# Patient Record
Sex: FEMALE | Race: BLACK OR AFRICAN AMERICAN | NOT HISPANIC OR LATINO
[De-identification: names, ages, dates, MRNs, and addresses within clinical notes are randomized per-mention and may not be internally consistent; named-entity substitution may affect disease eponyms.]

---

## 2017-03-17 PROBLEM — Z00.00 ENCOUNTER FOR PREVENTIVE HEALTH EXAMINATION: Status: ACTIVE | Noted: 2017-03-17

## 2017-03-29 ENCOUNTER — APPOINTMENT (OUTPATIENT)
Dept: ORTHOPEDIC SURGERY | Facility: CLINIC | Age: 47
End: 2017-03-29

## 2017-03-29 VITALS — BODY MASS INDEX: 31.7 KG/M2 | HEIGHT: 58 IN | WEIGHT: 151 LBS

## 2017-03-29 DIAGNOSIS — S83.241A OTHER TEAR OF MEDIAL MENISCUS, CURRENT INJURY, RIGHT KNEE, INITIAL ENCOUNTER: ICD-10-CM

## 2017-03-29 DIAGNOSIS — M25.561 PAIN IN RIGHT KNEE: ICD-10-CM

## 2017-03-29 DIAGNOSIS — M22.40 CHONDROMALACIA PATELLAE, UNSPECIFIED KNEE: ICD-10-CM

## 2017-03-29 DIAGNOSIS — Z78.9 OTHER SPECIFIED HEALTH STATUS: ICD-10-CM

## 2017-03-29 DIAGNOSIS — M25.562 PAIN IN RIGHT KNEE: ICD-10-CM

## 2017-03-29 RX ORDER — AZITHROMYCIN 250 MG/1
250 TABLET, FILM COATED ORAL
Qty: 6 | Refills: 0 | Status: DISCONTINUED | COMMUNITY
Start: 2016-12-05

## 2017-04-04 ENCOUNTER — OUTPATIENT (OUTPATIENT)
Dept: OUTPATIENT SERVICES | Facility: HOSPITAL | Age: 47
LOS: 1 days | End: 2017-04-04
Payer: COMMERCIAL

## 2017-04-04 ENCOUNTER — APPOINTMENT (OUTPATIENT)
Dept: MRI IMAGING | Facility: IMAGING CENTER | Age: 47
End: 2017-04-04

## 2017-04-04 DIAGNOSIS — Z98.89 OTHER SPECIFIED POSTPROCEDURAL STATES: Chronic | ICD-10-CM

## 2017-04-04 DIAGNOSIS — M25.561 PAIN IN RIGHT KNEE: ICD-10-CM

## 2017-04-04 PROCEDURE — 73721 MRI JNT OF LWR EXTRE W/O DYE: CPT

## 2017-04-10 ENCOUNTER — RESULT REVIEW (OUTPATIENT)
Age: 47
End: 2017-04-10

## 2017-04-14 ENCOUNTER — RESULT REVIEW (OUTPATIENT)
Age: 47
End: 2017-04-14

## 2017-07-31 ENCOUNTER — EMERGENCY (EMERGENCY)
Facility: HOSPITAL | Age: 47
LOS: 0 days | Discharge: HOME | End: 2017-07-31
Attending: EMERGENCY MEDICINE
Payer: COMMERCIAL

## 2017-07-31 VITALS
SYSTOLIC BLOOD PRESSURE: 125 MMHG | OXYGEN SATURATION: 99 % | WEIGHT: 143.08 LBS | HEIGHT: 59 IN | HEART RATE: 89 BPM | RESPIRATION RATE: 18 BRPM | DIASTOLIC BLOOD PRESSURE: 77 MMHG | TEMPERATURE: 98 F

## 2017-07-31 VITALS
SYSTOLIC BLOOD PRESSURE: 120 MMHG | RESPIRATION RATE: 16 BRPM | HEART RATE: 83 BPM | OXYGEN SATURATION: 98 % | DIASTOLIC BLOOD PRESSURE: 79 MMHG

## 2017-07-31 DIAGNOSIS — Z98.89 OTHER SPECIFIED POSTPROCEDURAL STATES: Chronic | ICD-10-CM

## 2017-07-31 LAB
ALBUMIN SERPL ELPH-MCNC: 3.5 G/DL — SIGNIFICANT CHANGE UP (ref 3.3–5)
ALP SERPL-CCNC: 56 U/L — SIGNIFICANT CHANGE UP (ref 40–120)
ALT FLD-CCNC: 22 U/L — SIGNIFICANT CHANGE UP (ref 12–78)
ANION GAP SERPL CALC-SCNC: 9 MMOL/L — SIGNIFICANT CHANGE UP (ref 5–17)
AST SERPL-CCNC: 19 U/L — SIGNIFICANT CHANGE UP (ref 15–37)
BASOPHILS # BLD AUTO: 0.1 K/UL — SIGNIFICANT CHANGE UP (ref 0–0.2)
BASOPHILS NFR BLD AUTO: 0.9 % — SIGNIFICANT CHANGE UP (ref 0–2)
BILIRUB SERPL-MCNC: 0.2 MG/DL — SIGNIFICANT CHANGE UP (ref 0.2–1.2)
BUN SERPL-MCNC: 11 MG/DL — SIGNIFICANT CHANGE UP (ref 7–23)
CALCIUM SERPL-MCNC: 8.3 MG/DL — LOW (ref 8.5–10.1)
CHLORIDE SERPL-SCNC: 103 MMOL/L — SIGNIFICANT CHANGE UP (ref 96–108)
CK MB BLD-MCNC: <0.6 % — SIGNIFICANT CHANGE UP (ref 0–3.5)
CK MB CFR SERPL CALC: <0.5 NG/ML — SIGNIFICANT CHANGE UP (ref 0.5–3.6)
CK SERPL-CCNC: 83 U/L — SIGNIFICANT CHANGE UP (ref 26–192)
CO2 SERPL-SCNC: 27 MMOL/L — SIGNIFICANT CHANGE UP (ref 22–31)
CREAT SERPL-MCNC: 0.72 MG/DL — SIGNIFICANT CHANGE UP (ref 0.5–1.3)
D DIMER BLD IA.RAPID-MCNC: 220 NG/ML DDU — SIGNIFICANT CHANGE UP
EOSINOPHIL # BLD AUTO: 0.1 K/UL — SIGNIFICANT CHANGE UP (ref 0–0.5)
EOSINOPHIL NFR BLD AUTO: 1.4 % — SIGNIFICANT CHANGE UP (ref 0–6)
GLUCOSE SERPL-MCNC: 85 MG/DL — SIGNIFICANT CHANGE UP (ref 70–99)
HCG SERPL-ACNC: <1 MIU/ML — SIGNIFICANT CHANGE UP
HCT VFR BLD CALC: 38.8 % — SIGNIFICANT CHANGE UP (ref 34.5–45)
HGB BLD-MCNC: 12.8 G/DL — SIGNIFICANT CHANGE UP (ref 11.5–15.5)
LYMPHOCYTES # BLD AUTO: 2.2 K/UL — SIGNIFICANT CHANGE UP (ref 1–3.3)
LYMPHOCYTES # BLD AUTO: 31.7 % — SIGNIFICANT CHANGE UP (ref 13–44)
MCHC RBC-ENTMCNC: 29.9 PG — SIGNIFICANT CHANGE UP (ref 27–34)
MCHC RBC-ENTMCNC: 33.1 GM/DL — SIGNIFICANT CHANGE UP (ref 32–36)
MCV RBC AUTO: 90.2 FL — SIGNIFICANT CHANGE UP (ref 80–100)
MONOCYTES # BLD AUTO: 0.6 K/UL — SIGNIFICANT CHANGE UP (ref 0–0.9)
MONOCYTES NFR BLD AUTO: 8 % — SIGNIFICANT CHANGE UP (ref 2–14)
NEUTROPHILS # BLD AUTO: 4 K/UL — SIGNIFICANT CHANGE UP (ref 1.8–7.4)
NEUTROPHILS NFR BLD AUTO: 57.9 % — SIGNIFICANT CHANGE UP (ref 43–77)
PLATELET # BLD AUTO: 202 K/UL — SIGNIFICANT CHANGE UP (ref 150–400)
POTASSIUM SERPL-MCNC: 3.9 MMOL/L — SIGNIFICANT CHANGE UP (ref 3.5–5.3)
POTASSIUM SERPL-SCNC: 3.9 MMOL/L — SIGNIFICANT CHANGE UP (ref 3.5–5.3)
PROT SERPL-MCNC: 7.3 GM/DL — SIGNIFICANT CHANGE UP (ref 6–8.3)
RBC # BLD: 4.3 M/UL — SIGNIFICANT CHANGE UP (ref 3.8–5.2)
RBC # FLD: 13.3 % — SIGNIFICANT CHANGE UP (ref 11–15)
SODIUM SERPL-SCNC: 139 MMOL/L — SIGNIFICANT CHANGE UP (ref 135–145)
TROPONIN I SERPL-MCNC: <.015 NG/ML — SIGNIFICANT CHANGE UP (ref 0.01–0.04)
TROPONIN I SERPL-MCNC: <.015 NG/ML — SIGNIFICANT CHANGE UP (ref 0.01–0.04)
WBC # BLD: 6.9 K/UL — SIGNIFICANT CHANGE UP (ref 3.8–10.5)
WBC # FLD AUTO: 6.9 K/UL — SIGNIFICANT CHANGE UP (ref 3.8–10.5)

## 2017-07-31 PROCEDURE — 99285 EMERGENCY DEPT VISIT HI MDM: CPT

## 2017-07-31 PROCEDURE — 71010: CPT | Mod: 26

## 2017-07-31 NOTE — ED ADULT NURSE REASSESSMENT NOTE - NS ED NURSE REASSESS COMMENT FT1
Received in [facility] on. A&OX4.. VSS.  No distress noted. Quietly resting. Will continue to monitor

## 2017-07-31 NOTE — ED ADULT TRIAGE NOTE - CHIEF COMPLAINT QUOTE
sent in by PMd for c/o chest pain , patient states " I woke up this am with a headache , went on with my chest becoming tightening "   denies radiation.

## 2017-07-31 NOTE — ED PROVIDER NOTE - MEDICAL DECISION MAKING DETAILS
trop neg. HEART Score is low (0-3) indicating a low risk (<2%) chance of a major cardiac event in 6 wks. pt appears well and non-toxic. . VSS. Sx have improved during ED stay. No acute events during ED observation period. Precautions given to return to the ED if sx persist or change. Pt expresses understanding and has no further questions. Pt feels comfortable and wishes to be discharged home. Instructed to follow up with PMD in 24 hrs. cards

## 2017-07-31 NOTE — ED PROVIDER NOTE - PRESENTING SYMPTOMS DETAILS
46F htn, fhx cad, nonsmoker come sin w cp earlier today. intermittent, cp is not positional / pleuritic / exertional. No radiation, no recent long travels, no LE edema/pain, no sob. currently cp-free. no lightheadedness / nausea.   ROS: No fever/chills, No photophobia/eye pain/changes in vision, No ear pain/sore throat/dysphagia, No palpitations, no SOB/cough/wheeze/stridor, No abdominal pain/N/V/D, no dysuria/frequency/discharge, No neck/back pain, no rash, no changes in neurological status/function.

## 2017-08-01 DIAGNOSIS — R07.9 CHEST PAIN, UNSPECIFIED: ICD-10-CM

## 2017-08-01 DIAGNOSIS — I10 ESSENTIAL (PRIMARY) HYPERTENSION: ICD-10-CM

## 2017-08-01 PROCEDURE — 93010 ELECTROCARDIOGRAM REPORT: CPT

## 2019-05-06 ENCOUNTER — RESULT REVIEW (OUTPATIENT)
Age: 49
End: 2019-05-06

## 2019-08-06 ENCOUNTER — EMERGENCY (EMERGENCY)
Facility: HOSPITAL | Age: 49
LOS: 1 days | Discharge: HOME | End: 2019-08-06
Attending: EMERGENCY MEDICINE | Admitting: EMERGENCY MEDICINE
Payer: COMMERCIAL

## 2019-08-06 VITALS
TEMPERATURE: 98 F | OXYGEN SATURATION: 100 % | SYSTOLIC BLOOD PRESSURE: 153 MMHG | DIASTOLIC BLOOD PRESSURE: 92 MMHG | HEART RATE: 86 BPM | RESPIRATION RATE: 18 BRPM

## 2019-08-06 DIAGNOSIS — Z98.89 OTHER SPECIFIED POSTPROCEDURAL STATES: Chronic | ICD-10-CM

## 2019-08-06 PROBLEM — I10 ESSENTIAL (PRIMARY) HYPERTENSION: Chronic | Status: ACTIVE | Noted: 2017-07-31

## 2019-08-06 LAB
ALBUMIN SERPL ELPH-MCNC: 4.2 G/DL — SIGNIFICANT CHANGE UP (ref 3.3–5)
ALP SERPL-CCNC: 53 U/L — SIGNIFICANT CHANGE UP (ref 40–120)
ALT FLD-CCNC: 10 U/L — SIGNIFICANT CHANGE UP (ref 4–33)
ANION GAP SERPL CALC-SCNC: 10 MMO/L — SIGNIFICANT CHANGE UP (ref 7–14)
APPEARANCE UR: CLEAR — SIGNIFICANT CHANGE UP
APTT BLD: 29.1 SEC — SIGNIFICANT CHANGE UP (ref 27.5–36.3)
AST SERPL-CCNC: 15 U/L — SIGNIFICANT CHANGE UP (ref 4–32)
BASE EXCESS BLDV CALC-SCNC: 4.4 MMOL/L — SIGNIFICANT CHANGE UP
BASOPHILS # BLD AUTO: 0.03 K/UL — SIGNIFICANT CHANGE UP (ref 0–0.2)
BASOPHILS NFR BLD AUTO: 0.5 % — SIGNIFICANT CHANGE UP (ref 0–2)
BILIRUB SERPL-MCNC: 0.2 MG/DL — SIGNIFICANT CHANGE UP (ref 0.2–1.2)
BILIRUB UR-MCNC: NEGATIVE — SIGNIFICANT CHANGE UP
BLOOD GAS VENOUS - CREATININE: 0.55 MG/DL — SIGNIFICANT CHANGE UP (ref 0.5–1.3)
BLOOD UR QL VISUAL: NEGATIVE — SIGNIFICANT CHANGE UP
BUN SERPL-MCNC: 8 MG/DL — SIGNIFICANT CHANGE UP (ref 7–23)
CALCIUM SERPL-MCNC: 9.3 MG/DL — SIGNIFICANT CHANGE UP (ref 8.4–10.5)
CHLORIDE BLDV-SCNC: 107 MMOL/L — SIGNIFICANT CHANGE UP (ref 96–108)
CHLORIDE SERPL-SCNC: 101 MMOL/L — SIGNIFICANT CHANGE UP (ref 98–107)
CO2 SERPL-SCNC: 27 MMOL/L — SIGNIFICANT CHANGE UP (ref 22–31)
COLOR SPEC: SIGNIFICANT CHANGE UP
CREAT SERPL-MCNC: 0.57 MG/DL — SIGNIFICANT CHANGE UP (ref 0.5–1.3)
EOSINOPHIL # BLD AUTO: 0.1 K/UL — SIGNIFICANT CHANGE UP (ref 0–0.5)
EOSINOPHIL NFR BLD AUTO: 1.6 % — SIGNIFICANT CHANGE UP (ref 0–6)
GAS PNL BLDV: 135 MMOL/L — LOW (ref 136–146)
GLUCOSE BLDV-MCNC: 78 MG/DL — SIGNIFICANT CHANGE UP (ref 70–99)
GLUCOSE SERPL-MCNC: 86 MG/DL — SIGNIFICANT CHANGE UP (ref 70–99)
GLUCOSE UR-MCNC: NEGATIVE — SIGNIFICANT CHANGE UP
HBA1C BLD-MCNC: 5.5 % — SIGNIFICANT CHANGE UP (ref 4–5.6)
HCG SERPL-ACNC: < 5 MIU/ML — SIGNIFICANT CHANGE UP
HCO3 BLDV-SCNC: 26 MMOL/L — SIGNIFICANT CHANGE UP (ref 20–27)
HCT VFR BLD CALC: 36.5 % — SIGNIFICANT CHANGE UP (ref 34.5–45)
HCT VFR BLDV CALC: 35.3 % — SIGNIFICANT CHANGE UP (ref 34.5–45)
HGB BLD-MCNC: 11.1 G/DL — LOW (ref 11.5–15.5)
HGB BLDV-MCNC: 11.4 G/DL — LOW (ref 11.5–15.5)
IMM GRANULOCYTES NFR BLD AUTO: 0.3 % — SIGNIFICANT CHANGE UP (ref 0–1.5)
INR BLD: 1.03 — SIGNIFICANT CHANGE UP (ref 0.88–1.17)
KETONES UR-MCNC: NEGATIVE — SIGNIFICANT CHANGE UP
LACTATE BLDV-MCNC: 1.3 MMOL/L — SIGNIFICANT CHANGE UP (ref 0.5–2)
LEUKOCYTE ESTERASE UR-ACNC: NEGATIVE — SIGNIFICANT CHANGE UP
LYMPHOCYTES # BLD AUTO: 2.23 K/UL — SIGNIFICANT CHANGE UP (ref 1–3.3)
LYMPHOCYTES # BLD AUTO: 35.5 % — SIGNIFICANT CHANGE UP (ref 13–44)
MCHC RBC-ENTMCNC: 23.3 PG — LOW (ref 27–34)
MCHC RBC-ENTMCNC: 30.4 % — LOW (ref 32–36)
MCV RBC AUTO: 76.7 FL — LOW (ref 80–100)
MONOCYTES # BLD AUTO: 0.45 K/UL — SIGNIFICANT CHANGE UP (ref 0–0.9)
MONOCYTES NFR BLD AUTO: 7.2 % — SIGNIFICANT CHANGE UP (ref 2–14)
NEUTROPHILS # BLD AUTO: 3.46 K/UL — SIGNIFICANT CHANGE UP (ref 1.8–7.4)
NEUTROPHILS NFR BLD AUTO: 54.9 % — SIGNIFICANT CHANGE UP (ref 43–77)
NITRITE UR-MCNC: NEGATIVE — SIGNIFICANT CHANGE UP
NRBC # FLD: 0 K/UL — SIGNIFICANT CHANGE UP (ref 0–0)
PCO2 BLDV: 58 MMHG — HIGH (ref 41–51)
PH BLDV: 7.33 PH — SIGNIFICANT CHANGE UP (ref 7.32–7.43)
PH UR: 8 — SIGNIFICANT CHANGE UP (ref 5–8)
PLATELET # BLD AUTO: 297 K/UL — SIGNIFICANT CHANGE UP (ref 150–400)
PMV BLD: 11.2 FL — SIGNIFICANT CHANGE UP (ref 7–13)
PO2 BLDV: 18 MMHG — LOW (ref 35–40)
POTASSIUM BLDV-SCNC: 3.7 MMOL/L — SIGNIFICANT CHANGE UP (ref 3.4–4.5)
POTASSIUM SERPL-MCNC: 4.3 MMOL/L — SIGNIFICANT CHANGE UP (ref 3.5–5.3)
POTASSIUM SERPL-SCNC: 4.3 MMOL/L — SIGNIFICANT CHANGE UP (ref 3.5–5.3)
PROT SERPL-MCNC: 7.5 G/DL — SIGNIFICANT CHANGE UP (ref 6–8.3)
PROT UR-MCNC: NEGATIVE — SIGNIFICANT CHANGE UP
PROTHROM AB SERPL-ACNC: 11.8 SEC — SIGNIFICANT CHANGE UP (ref 9.8–13.1)
RBC # BLD: 4.76 M/UL — SIGNIFICANT CHANGE UP (ref 3.8–5.2)
RBC # FLD: 20.2 % — HIGH (ref 10.3–14.5)
SAO2 % BLDV: 17.7 % — LOW (ref 60–85)
SODIUM SERPL-SCNC: 138 MMOL/L — SIGNIFICANT CHANGE UP (ref 135–145)
SP GR SPEC: 1.01 — SIGNIFICANT CHANGE UP (ref 1–1.04)
UROBILINOGEN FLD QL: NORMAL — SIGNIFICANT CHANGE UP
WBC # BLD: 6.29 K/UL — SIGNIFICANT CHANGE UP (ref 3.8–10.5)
WBC # FLD AUTO: 6.29 K/UL — SIGNIFICANT CHANGE UP (ref 3.8–10.5)

## 2019-08-06 PROCEDURE — 70450 CT HEAD/BRAIN W/O DYE: CPT | Mod: 26

## 2019-08-06 PROCEDURE — 99220: CPT

## 2019-08-06 RX ORDER — ONDANSETRON 8 MG/1
4 TABLET, FILM COATED ORAL ONCE
Refills: 0 | Status: COMPLETED | OUTPATIENT
Start: 2019-08-06 | End: 2019-08-06

## 2019-08-06 RX ADMIN — ONDANSETRON 4 MILLIGRAM(S): 8 TABLET, FILM COATED ORAL at 12:19

## 2019-08-06 NOTE — CONSULT NOTE ADULT - SUBJECTIVE AND OBJECTIVE BOX
HPI:  Patient is a 48Y RH AA Female with PMH HTN who presented with L sided facial droop. LKN was 9:30am this morning. Stated that she went to put her daughter on the bus and started feeling very nauseous and began dry heaving. Went to work and had her BP checked which she stated was 120s systolic. Her work supervisor noticed her L sided facial droop and suggested she get it checked out. Patient herself did not notice any droop or facial numbness initially. She was initially evaluated by ED and was thought to have Bell's palsy. Upon re-evaluation, was noticed to be speaking differently and less dysarthric than prior. Patient also stated she had gait instability which was later explained as "floating on air" but did not have any actual weakness or numbness in her extremities. Code stroke called for new information- dysarthria and gait instability. CT Head was unremarkable for acute intracranial pathology. NIHSS: 0 (mild sensory loss of L V3 distribution)), MRS: 0     PAST MEDICAL & SURGICAL HISTORY:  Hypertension  S/P breast lumpectomy  S/P  section    FAMILY HISTORY:  No pertinent family history in first degree relatives    Allergies    No Known Allergies    Intolerances        SHx - No smoking, No ETOH, No drug abuse    Review of Systems:  CONSTITUTIONAL:  No weight loss, fever, chills, weakness or fatigue.  HEENT:  Eyes:  No visual loss, blurred vision, double vision or yellow sclerae. Ears, Nose, Throat:  No hearing loss, sneezing, congestion, runny nose or sore throat.  CARDIOVASCULAR:  No chest pain, chest pressure or chest discomfort. No palpitations or edema.  GASTROINTESTINAL:  No anorexia, nausea, vomiting or diarrhea. No abdominal pain or blood.  NEUROLOGICAL: See HPI  MUSCULOSKELETAL:  No muscle, back pain, joint pain or stiffness.  PSYCHIATRIC:  No history of depression or anxiety.      Vital Signs Last 24 Hrs  T(C): 36.6 (06 Aug 2019 10:34), Max: 36.6 (06 Aug 2019 10:34)  T(F): 97.8 (06 Aug 2019 10:34), Max: 97.8 (06 Aug 2019 10:34)  HR: 72 (06 Aug 2019 14:07) (72 - 86)  BP: 169/101 (06 Aug 2019 14:07) (153/92 - 169/101)  BP(mean): 127 (06 Aug 2019 14:07) (127 - 127)  RR: 12 (06 Aug 2019 14:07) (12 - 18)  SpO2: 100% (06 Aug 2019 14:07) (100% - 100%)    General Exam:   General appearance: No acute distress                   Neurological Exam:  Mental Status: Orientated to self, date and place.    No dysarthria, aphasia or neglect.      Cranial Nerves: CN I - not tested.  PERRL, EOMI, VFF, no nystagmus or diplopia.  No APD.    Fundi not visualized bilaterally.    No facial asymmetry.  Hearing intact to finger rub bilaterally.     Motor:   Tone: normal.                  Strength:     [] Upper extremity                      Delt       Bicep    Tricep                                                  R         5/5        5/5        5/5       5/5                                               L          5/5        5/5        5/5       5/5  [] Lower extremity                       HF          KE          KF        DF         PF                                               R        5/5        5/5        5/5       5/5       5/5                                               L         5/5        5/5       5/5       5/5        5/5  Pronator drift: none                 Dysmetria: BL NL FTN  No truncal ataxia.    Tremor: No resting, postural or action tremor.  No myoclonus.    Sensation: intact to light touch, pinprick, vibration and proprioception    Deep Tendon Reflexes:   Right 2+ : BC, TC, BRD   Left 2+ : BC, TC, BRD  Right 2+ Knee, 1+ ankle  Left 2+ Knee, 1+ ankle    Toes flexor bilaterally  Gait: normal and stable.  Tandem toe walking, heel walking normal     NIHSS: 1  MRS: 0       Radiology    EXAM:  CT BRAIN STROKE PROTOCOL        PROCEDURE DATE:  Aug  6 2019         INTERPRETATION:  Clinical History: Right facial droop gait instability.    Technique:  Multiple axial sections were acquired from the base of the skull to the   vertex without contrast enhancement. Coronal and sagittal reconstructed   images were performed as well.    This exam is compared with prior noncontrast head CT performed on 2015.    Findings:  The lateral ventricles have a normal configuration.    There is no evidence of acute hemorrhage, mass or mass-effect in the   posterior fossa or in the supratentorial region.    Evaluation of the osseous structures with the appropriate window appears   unremarkable.    Impression:  Unremarkable noncontrasthead CT.    If symptoms continue MRI can be done for further evaluation if there are   no contra indications.      < end of copied text > HPI:  Patient is a 48Y RH AA Female with PMH HTN who presented with L sided facial droop. LKN was 9:30am this morning. Stated that she went to put her daughter on the bus and started feeling very nauseous and began dry heaving. Went to work and had her BP checked which she stated was 120s systolic. Her work supervisor noticed her L sided facial droop and suggested she get it checked out. Patient herself did not notice any droop or facial numbness initially. She was initially evaluated by ED and was thought to have Bell's palsy. Upon re-evaluation, was noticed to be speaking differently and less dysarthric than prior. Patient also stated she had gait instability which was later explained as "floating on air" but did not have any actual weakness or numbness in her extremities. Code stroke called for new information- dysarthria and gait instability. CT Head was unremarkable for acute intracranial pathology. NIHSS: 0 (mild sensory loss of L V3 distribution)), MRS: 0     PAST MEDICAL & SURGICAL HISTORY:  Hypertension  S/P breast lumpectomy  S/P  section    FAMILY HISTORY:  No pertinent family history in first degree relatives    Allergies    No Known Allergies    Intolerances        SHx - No smoking, No ETOH, No drug abuse    Review of Systems:  CONSTITUTIONAL:  No weight loss, fever, chills, weakness or fatigue.  HEENT:  Eyes:  No visual loss, blurred vision, double vision or yellow sclerae. Ears, Nose, Throat:  No hearing loss, sneezing, congestion, runny nose or sore throat.  CARDIOVASCULAR:  No chest pain, chest pressure or chest discomfort. No palpitations or edema.  GASTROINTESTINAL:  No anorexia, nausea, vomiting or diarrhea. No abdominal pain or blood.  NEUROLOGICAL: See HPI  MUSCULOSKELETAL:  No muscle, back pain, joint pain or stiffness.  PSYCHIATRIC:  No history of depression or anxiety.      Vital Signs Last 24 Hrs  T(C): 36.6 (06 Aug 2019 10:34), Max: 36.6 (06 Aug 2019 10:34)  T(F): 97.8 (06 Aug 2019 10:34), Max: 97.8 (06 Aug 2019 10:34)  HR: 72 (06 Aug 2019 14:07) (72 - 86)  BP: 169/101 (06 Aug 2019 14:07) (153/92 - 169/101)  BP(mean): 127 (06 Aug 2019 14:07) (127 - 127)  RR: 12 (06 Aug 2019 14:07) (12 - 18)  SpO2: 100% (06 Aug 2019 14:07) (100% - 100%)    General Exam:   General appearance: No acute distress                   Neurological Exam:  Mental Status: Orientated to self, date and place.    No dysarthria, aphasia or neglect.      Cranial Nerves: CN I - not tested.  PERRL, EOMI, VFF, no nystagmus or diplopia.     No facial asymmetry.  Hearing intact to finger rub bilaterally.     Motor:   Tone: normal.                  Strength:     [] Upper extremity                      Delt       Bicep    Tricep                                                  R         5/5        5/5        5/5       5/5                                               L          5/5        5/5        5/5       5/5  [] Lower extremity                       HF          KE          KF        DF         PF                                               R        /        5/5        5/5       5/5       5/5                                               L         5/5        5/5       5/5       5/5        5/5  Pronator drift: none                 Dysmetria: BL NL FTN  No truncal ataxia.    Tremor: No resting, postural or action tremor.  No myoclonus.    Sensation: mild sensory loss of L CN V3 distribution)    Deep Tendon Reflexes:   Right 2+ : BC, TC, BRD   Left 2+ : BC, TC, BRD  Right 2+ Knee, 1+ ankle  Left 2+ Knee, 1+ ankle    Toes flexor bilaterally  Gait: normal and stable.  Tandem toe walking, heel walking normal     NIHSS: 1  MRS: 0       Radiology    EXAM:  CT BRAIN STROKE PROTOCOL        PROCEDURE DATE:  Aug  6 2019         INTERPRETATION:  Clinical History: Right facial droop gait instability.    Technique:  Multiple axial sections were acquired from the base of the skull to the   vertex without contrast enhancement. Coronal and sagittal reconstructed   images were performed as well.    This exam is compared with prior noncontrast head CT performed on 2015.    Findings:  The lateral ventricles have a normal configuration.    There is no evidence of acute hemorrhage, mass or mass-effect in the   posterior fossa or in the supratentorial region.    Evaluation of the osseous structures with the appropriate window appears   unremarkable.    Impression:  Unremarkable noncontrasthead CT.    If symptoms continue MRI can be done for further evaluation if there are   no contra indications.      < end of copied text >

## 2019-08-06 NOTE — ED PROVIDER NOTE - NSFOLLOWUPINSTRUCTIONS_ED_ALL_ED_FT
Arzola’s Palsy    Bell’s palsy is a condition in which the muscles on one side of the face become paralyzed. This often causes one side of the face to droop. It is a common condition and many people recover completely. Causes include viral infections but most of the time the reason remains unknown. Signs and symptoms include not being able to raise your eyebrow, not being able to close your eye, drooping of the eyelid and corner of the mouth, sensitivity to loud noises, dryness of the eye, change in taste, and not being able to close your mouth and drooling. Take medicines only as directed by your health care provider. If your eye is affected, use moisturizing eye drops to prevent drying of your eye and tape your eyelid shut at night.    SEEK IMMEDIATE MEDICAL CARE IF YOU HAVE ANY OF THE FOLLOWING SYMPTOMS: weakness or numbness in another part of your body, difficulty swallowing, fever, or neck pain.

## 2019-08-06 NOTE — ED PROVIDER NOTE - NS ED ROS FT
Review of Systems    Constitutional: (-) fever, (-) chills, (-) fatigue  HEENT: (-) dysphagia, (-) hoarseness, (-) nasal congestion  Cardiovascular: (-) chest pain, (-) syncope  Respiratory: (-) cough, (-) shortness of breath  Gastrointestinal: (-) vomiting, (-) diarrhea, (-) abdominal pain, (+) nausea  Musculoskeletal: (-) neck pain, (-) back pain, (-) joint pain  Integumentary: (-) rash, (-) edema, (-) wound  Neurological: (-) headache, (-) altered mental status, (+) facial weakness    Except as documented in the HPI, all other systems are negative. Review of Systems    Constitutional: (-) fever, (-) chills, (-) fatigue, (+) recent illness, "cold," resolved  HEENT: (-) dysphagia, (-) hoarseness, (-) nasal congestion  Cardiovascular: (-) chest pain, (-) syncope  Respiratory: (-) cough, (-) shortness of breath  Gastrointestinal: (-) vomiting, (-) diarrhea, (-) abdominal pain, (+) nausea  Musculoskeletal: (-) neck pain, (-) back pain, (-) joint pain  Integumentary: (-) rash, (-) edema, (-) wound  Neurological: (-) headache, (-) altered mental status, (+) facial weakness    Except as documented in the HPI, all other systems are negative.

## 2019-08-06 NOTE — ED CDU PROVIDER INITIAL DAY NOTE - ATTENDING CONTRIBUTION TO CARE
I, Jennifer Cabot, MD, have performed a history and physical exam of the patient and discussed their management with the ACP.  I reviewed the PA's note and agree with the documented findings and plan of care. My medical decision making and observations are found above.    Cabot: 48F with PMH of HTN, not on AC, who comes in with L facial droop, speech changes, gait problems, and nausea that started at 930am.  Resolved while in ED.  Did not receive tPA.  CT head neg.  On exam at the time of this note, HDS, NAD, MMM, eyes clear, lungs CTAB, heart sounds normal, abd soft, NT, ND, no CVAT, LEs without edema, wwp, skin normal temperature and color, AAOx3, PERRLA, EOMIB, CN 2-12 intact, 5/5 strength, SILT, no drift, normal gait.  Admitted to CDU for MRI to eval for CVA vs TIA.

## 2019-08-06 NOTE — ED PROVIDER NOTE - CLINICAL SUMMARY MEDICAL DECISION MAKING FREE TEXT BOX
48 year old female with left facial weakness and nausea since this morning.  Isolated left CN VII weakness, including forehead.  EOMI and CN II-XII otherwise intact with normal strength and sensation in all 4 extremities. Likely Bell's Palsy, no indication of CVA or indication for CT at this time.  Will likely DC with prednisone, valtrex, and eye drops. 48 year old female with left facial weakness and nausea since this morning.  Isolated left CN VII weakness, including forehead.  EOMI and CN II-XII otherwise intact with normal strength and sensation in all 4 extremities.  Hours after arriving in ED, patient stated that she had difficulty ambulating earlier this morning.  At this time, CT was ordered and code stroke was called to r/o CVA. Differential includes TIA, CVA, Bell's Palsy.  Pt transferred to main ED for further evaluation and management.

## 2019-08-06 NOTE — ED PROVIDER NOTE - ATTENDING CONTRIBUTION TO CARE
I, Jennifer Cabot, MD, have performed a history and physical exam of the patient and discussed their management with the resident. I reviewed the resident's note and agree with the documented findings and plan of care. My medical decision making and observations are found above.    Cabot: 48F with PMH of HTN who comes in with several hours of R facial droop and nausea.  No trauma.  No numbness or weakness other than the face, no vision changes or speech changes.  On exam, AAOx3, PERRLA, EOMIB, R lower facial droop > R upper facial droop, otherwise 5/5 strength, SILT, normal gait, no perineal numbness.  Most likely Bell's Palsy with very mild weakness of the upper face but will check CT head, tx nausea, reassess. I, Jennifer Cabot, MD, have performed a history and physical exam of the patient and discussed their management with the resident. I reviewed the resident's note and agree with the documented findings and plan of care. My medical decision making and observations are found above.    Cabot: 48F with PMH of HTN who comes in with several hours of L facial droop and nausea.  No trauma.  No numbness or weakness other than the face, no vision changes or speech changes.  On exam, AAOx3, PERRLA, EOMIB, L lower facial droop > L upper facial droop, otherwise 5/5 strength, SILT, normal gait, no perineal numbness.  Most likely Bell's Palsy with very mild weakness of the upper face but will check CT head, tx nausea, reassess. I, Jennifer Cabot, MD, have performed a history and physical exam of the patient and discussed their management with the resident. I reviewed the resident's note and agree with the documented findings and plan of care. My medical decision making and observations are found above.    Cabot: 48F with PMH of HTN, not on AC, who comes in with several hours of L facial droop and nausea (since 930am).  No trauma.  No numbness or weakness other than the face, no vision changes or speech changes.  On exam, AAOx3, PERRLA, EOMIB, L lower facial droop > L upper facial droop, with mild L lower facial decreased sensation to light touch, otherwise 5/5 strength, SILT, normal gait, no perineal numbness.  Most likely Bell's Palsy with very mild weakness of the upper face but will check CT head, tx nausea, reassess.

## 2019-08-06 NOTE — ED CDU PROVIDER INITIAL DAY NOTE - MEDICAL DECISION MAKING DETAILS
Cabot: 48F with PMH of HTN, not on AC, who comes in with L facial droop, speech changes, gait problems, and nausea that started at 930am.  Resolved while in ED.  CT head neg.  Exam normal.  Admitted to CDU for MRI to eval for CVA vs TIA.

## 2019-08-06 NOTE — ED PROVIDER NOTE - PHYSICAL EXAMINATION
VITAL SIGNS: I have reviewed nursing notes and confirm.  CONSTITUTIONAL: non-toxic, well appearing  SKIN: no rash, no petechiae.  EYES: PERRL, EOMI, pink conjunctiva, anicteric  ENT: tongue and uvular midline, no exudates, moist mucoud membranes  NECK: Supple; no meningismus, no JVD  CARD: RRR, no murmurs, equal radial pulses bilaterally 2+  RESP: CTAB, no respiratory distress  ABD: Soft, non-tender, non-distended, no peritoneal signs, no CVA tenderness  EXT: Normal ROM x4. No edema. No calf tenderness  NEURO: Alert, oriented. Left facial weakness with flattening of nasolabial folds including left forehead, decreased sensation to left face, strength 5/5 and symmetric in all 4 extremities. Cerebellar testing intact.  Negative Romberg.  PSYCH: Cooperative, appropriate. VITAL SIGNS: I have reviewed nursing notes and confirm.  CONSTITUTIONAL: non-toxic, well appearing  SKIN: no rash, no petechiae.  EYES: PERRL, EOMI, pink conjunctiva, anicteric  ENT: tongue and uvular midline, no exudates, moist mucous membranes  NECK: Supple; no meningismus, no JVD  CARD: RRR, no murmurs, equal radial pulses bilaterally 2+  RESP: CTAB, no respiratory distress  ABD: Soft, non-tender, non-distended, no peritoneal signs, no CVA tenderness  EXT: Normal ROM x4. No edema. No calf tenderness  NEURO: Alert, oriented. Left facial weakness with flattening of nasolabial folds including left forehead, decreased sensation to left face, strength 5/5 and symmetric in all 4 extremities. Cerebellar testing intact.  Negative Romberg.  Cranial nerves otherwise intact.   PSYCH: Cooperative, appropriate.

## 2019-08-06 NOTE — ED PROVIDER NOTE - OBJECTIVE STATEMENT
48 year old female with PMH HTN presents with nausea and left sided facial weakness since this morning.  Pt states she woke up and her boyfriend told her she had a left facial droop. Pt also c/o nausea, states she had 2 episodes of dry heaving, but did not vomiting. Denies numbness, paresthesias, dysarthria, abdominal pain, vision changes, ear pain, rash, difficulty ambulating, difficulty swallowing, or difficulty with coordination.  Denies any headache or recent trauma.  Denies any additional complaints. 48 year old female with PMH HTN, not on AC, presents with nausea and left sided facial weakness since this morning.  Pt states she woke up and her boyfriend told her she had a left facial droop. Pt also c/o nausea, states she had 2 episodes of dry heaving, but did not vomiting. Denies numbness, paresthesias, dysarthria, abdominal pain, vision changes, ear pain, rash, difficulty ambulating, difficulty swallowing, or difficulty with coordination.  Denies any headache or recent trauma.  Denies any additional complaints.

## 2019-08-06 NOTE — ED CDU PROVIDER INITIAL DAY NOTE - OBJECTIVE STATEMENT
48yF w/pmhx HTN p/w left sided facial droop. Pt reports this morning she had nausea and dry heaving. Upon arrival to work her supervisor told her that she had a left sided facial droop. Pt reports "feeling like she was floating" with walking earlier today. Pt states she was told she had slurred speech earlier but is unsure is she had difficulty speaking. Pt denies numbness/tingling, weakness, difficulty swallowing, changes to vision, abd pain, vomiting, diarrhea, chest pain, shortness of breath, recent travel or illness or any other concerns.  In main ED code stroke called, CT head normal  Pt seen by neuro, sent to CDU for MRI/MRa, tele, echo and neuro checks

## 2019-08-06 NOTE — ED PROVIDER NOTE - PROGRESS NOTE DETAILS
Cabot: On reeval, the facial droop resolved, and the patient mentions that she had speech and gait changes at the time.  Given the dynamic neuro changes, will call a code stroke and order CTA head/neck. Dr. Wiley: Pt was signed out to main from Intake for concern for TIA/CVA symptoms that have since resolved. Neuro consulted. Dr. Wiley: No acute findings on CT will admit to OBS for MRI and further eval.

## 2019-08-06 NOTE — ED ADULT NURSE NOTE - OBJECTIVE STATEMENT
The patient is a 48 year old female  who arrives complaining of nausea with dry heaving starting at 0830. After arriving at work, her supervisor noted she "did not look right" and then appreciated a left sided facial droop. When leaving work she was walking to a coworkers car and felt "like I was floating. My legs felt strange". Code stroke called. Neurology at bedside. CT Scan of head completed. 18g PIV placed to Right A/C. All labs sent. The patient is a 48 year old female  who arrives complaining of nausea with dry heaving starting at 0830. After arriving at work, her supervisor noted she "did not look right" and then appreciated a left sided facial droop. When leaving work she was walking to a coworkers car and felt "like I was floating. My legs felt strange". Code stroke called. Neurology at bedside. CT Scan of head completed. Patient currently only endorses Left cheek numbness. 18g PIV placed to Right A/C. All labs sent.

## 2019-08-07 VITALS
RESPIRATION RATE: 16 BRPM | TEMPERATURE: 98 F | DIASTOLIC BLOOD PRESSURE: 69 MMHG | OXYGEN SATURATION: 100 % | SYSTOLIC BLOOD PRESSURE: 117 MMHG | HEART RATE: 77 BPM

## 2019-08-07 LAB
CHOLEST SERPL-MCNC: 139 MG/DL — SIGNIFICANT CHANGE UP (ref 120–199)
HDLC SERPL-MCNC: 39 MG/DL — LOW (ref 45–65)
LIPID PNL WITH DIRECT LDL SERPL: 100 MG/DL — SIGNIFICANT CHANGE UP
TRIGL SERPL-MCNC: 33 MG/DL — SIGNIFICANT CHANGE UP (ref 10–149)

## 2019-08-07 PROCEDURE — 70544 MR ANGIOGRAPHY HEAD W/O DYE: CPT | Mod: 26,59

## 2019-08-07 PROCEDURE — 99217: CPT

## 2019-08-07 PROCEDURE — 70548 MR ANGIOGRAPHY NECK W/DYE: CPT | Mod: 26

## 2019-08-07 PROCEDURE — 70552 MRI BRAIN STEM W/DYE: CPT | Mod: 26

## 2019-08-07 PROCEDURE — 93306 TTE W/DOPPLER COMPLETE: CPT | Mod: 26

## 2019-08-07 PROCEDURE — 99285 EMERGENCY DEPT VISIT HI MDM: CPT | Mod: GC

## 2019-08-07 RX ORDER — ACETAMINOPHEN 500 MG
650 TABLET ORAL ONCE
Refills: 0 | Status: COMPLETED | OUTPATIENT
Start: 2019-08-07 | End: 2019-08-07

## 2019-08-07 RX ORDER — ATORVASTATIN CALCIUM 80 MG/1
80 TABLET, FILM COATED ORAL DAILY
Refills: 0 | Status: DISCONTINUED | OUTPATIENT
Start: 2019-08-07 | End: 2019-08-12

## 2019-08-07 RX ORDER — DIAZEPAM 5 MG
5 TABLET ORAL ONCE
Refills: 0 | Status: DISCONTINUED | OUTPATIENT
Start: 2019-08-07 | End: 2019-08-07

## 2019-08-07 RX ADMIN — ATORVASTATIN CALCIUM 80 MILLIGRAM(S): 80 TABLET, FILM COATED ORAL at 12:55

## 2019-08-07 RX ADMIN — Medication 5 MILLIGRAM(S): at 01:12

## 2019-08-07 RX ADMIN — Medication 650 MILLIGRAM(S): at 05:03

## 2019-08-07 NOTE — ED CDU PROVIDER DISPOSITION NOTE - ATTENDING CONTRIBUTION TO CARE
I, Jennifer Cabot, MD, have performed a history and physical exam of the patient and discussed their management with the ACP.  I reviewed the PA's note and agree with the documented findings and plan of care. My medical decision making and observations are found above.    Cabot: 48F with PMH of HTN, not on AC, who presented with L facial droop, speech changes, gait problems, and nausea that started at 930am.  Resolved while in ED.  Did not receive tPA.  CT head neg.  MRI neg for acute pathology.  ECHO with bubble did not demonstrate a definite intracardiac shunt.  On exam at the time of this note, HDS, NAD, MMM, eyes clear, lungs CTAB, heart sounds normal, abd soft, NT, ND, no CVAT, LEs without edema, wwp, skin normal temperature and color, AAOx3, PERRLA, EOMIB, CN 2-12 intact, 5/5 strength, SILT, no drift, normal gait. I, Jennifer Cabot, MD, have performed a history and physical exam of the patient and discussed their management with the ACP.  I reviewed the PA's note and agree with the documented findings and plan of care. My medical decision making and observations are found above.    Cabot: 48F with PMH of HTN, not on AC, who presented with L facial droop, speech changes, gait problems, and nausea that started at 930am.  Resolved while in ED.  Did not receive tPA.  CT head neg.  MRI neg for acute pathology.  ECHO with bubble did not demonstrate a definite intracardiac shunt.  Neurology feels that this most likely was a complex migraine.  On exam at the time of this note, HDS, NAD, MMM, eyes clear, lungs CTAB, heart sounds normal, abd soft, NT, ND, no CVAT, LEs without edema, wwp, skin normal temperature and color, AAOx3, PERRLA, EOMIB, CN 2-12 intact, 5/5 strength, SILT, no drift, normal gait.  Will discharge home.

## 2019-08-07 NOTE — ED CDU PROVIDER SUBSEQUENT DAY NOTE - HISTORY
CDU Initial Note HPI: "48yF w/pmhx HTN p/w left sided facial droop. Pt reports this morning she had nausea and dry heaving. Upon arrival to work her supervisor told her that she had a left sided facial droop. Pt reports "feeling like she was floating" with walking earlier today. Pt states she was told she had slurred speech earlier but is unsure is she had difficulty speaking. Pt denies numbness/tingling, weakness, difficulty swallowing, changes to vision, abd pain, vomiting, diarrhea, chest pain, shortness of breath, recent travel or illness or any other concerns.  In main ED code stroke called, CT head normal  Pt seen by neuro, sent to CDU for MRI/MRa, tele, echo and neuro checks"  In the interim, pt objectively noted to be resting comfortably.  MRI performed early this am; official radiology results pending at time of this document entry.

## 2019-08-07 NOTE — ED CDU PROVIDER SUBSEQUENT DAY NOTE - PROGRESS NOTE DETAILS
Pt reassessed and is stable, MRA is grossly normal pending AM neurology reassessment. Pt denies any acute symptoms or complaints.

## 2019-08-07 NOTE — ED CDU PROVIDER SUBSEQUENT DAY NOTE - ATTENDING CONTRIBUTION TO CARE
I, Jennifer Cabot, MD, have performed a history and physical exam of the patient and discussed their management with the resident. I reviewed the resident's note and agree with the documented findings and plan of care. My medical decision making and observations are found above.    Cabot: 48F with PMH of HTN, not on AC, who presented with L facial droop, speech changes, gait problems, and nausea that started at 930am.  Resolved while in ED.  Did not receive tPA.  CT head neg.  MRI neg for acute pathology.  On exam at the time of this note, HDS, NAD, MMM, eyes clear, lungs CTAB, heart sounds normal, abd soft, NT, ND, no CVAT, LEs without edema, wwp, skin normal temperature and color, AAOx3, PERRLA, EOMIB, CN 2-12 intact, 5/5 strength, SILT, no drift, normal gait.  Pending ECHO and final neuro recommendations.

## 2019-08-07 NOTE — ED CDU PROVIDER DISPOSITION NOTE - CLINICAL COURSE
Cabot: 48F with PMH of HTN, not on AC, who presented with L facial droop, speech changes, gait problems, and nausea that started at 930am.  Resolved while in ED.  Did not receive tPA.  CT head neg.  MRI neg for acute pathology.  ECHO with bubble did not demonstrate a definite intracardiac shunt.  On exam at the time of this note, HDS, NAD, MMM, eyes clear, lungs CTAB, heart sounds normal, abd soft, NT, ND, no CVAT, LEs without edema, wwp, skin normal temperature and color, AAOx3, PERRLA, EOMIB, CN 2-12 intact, 5/5 strength, SILT, no drift, normal gait. Cabot: 48F with PMH of HTN, not on AC, who presented with L facial droop, speech changes, gait problems, and nausea that started at 930am.  Resolved while in ED.  Did not receive tPA.  CT head neg.  MRI neg for acute pathology.  ECHO with bubble did not demonstrate a definite intracardiac shunt.  Neurology feels that this most likely was a complex migraine.  On exam at the time of this note, HDS, NAD, MMM, eyes clear, lungs CTAB, heart sounds normal, abd soft, NT, ND, no CVAT, LEs without edema, wwp, skin normal temperature and color, AAOx3, PERRLA, EOMIB, CN 2-12 intact, 5/5 strength, SILT, no drift, normal gait.

## 2019-08-07 NOTE — ED CDU PROVIDER DISPOSITION NOTE - NSFOLLOWUPINSTRUCTIONS_ED_ALL_ED_FT
You have been seen for a left facial droop that has resolved.      We believe that this is due to a complex migraine, which can present with muscle weakness (such as the face), numbness, and confusion.  We do not believe that this was a stroke or mini stroke.    We would like you to follow up in the neurology clinic.  Please call [phone] to make an appointment.  The office is located at [address] [place] [address].    Please come back to the ED if you experience numbness, weakness, loss of consciousness, speech changes, or other concerns.

## 2019-08-12 ENCOUNTER — APPOINTMENT (OUTPATIENT)
Dept: NEUROLOGY | Facility: CLINIC | Age: 49
End: 2019-08-12
Payer: COMMERCIAL

## 2019-08-12 VITALS
SYSTOLIC BLOOD PRESSURE: 104 MMHG | BODY MASS INDEX: 31.07 KG/M2 | DIASTOLIC BLOOD PRESSURE: 70 MMHG | HEART RATE: 88 BPM | WEIGHT: 148 LBS | HEIGHT: 58 IN

## 2019-08-12 PROCEDURE — 99205 OFFICE O/P NEW HI 60 MIN: CPT

## 2019-08-12 RX ORDER — PROMETHAZINE HYDROCHLORIDE AND CODEINE PHOSPHATE 6.25; 1 MG/5ML; MG/5ML
6.25-1 SOLUTION ORAL
Qty: 180 | Refills: 0 | Status: DISCONTINUED | COMMUNITY
Start: 2016-12-12 | End: 2019-08-12

## 2019-08-12 RX ORDER — BENZONATATE 200 MG/1
200 CAPSULE ORAL
Qty: 20 | Refills: 0 | Status: DISCONTINUED | COMMUNITY
Start: 2016-12-05 | End: 2019-08-12

## 2019-08-21 ENCOUNTER — NON-APPOINTMENT (OUTPATIENT)
Age: 49
End: 2019-08-21

## 2019-08-21 ENCOUNTER — APPOINTMENT (OUTPATIENT)
Dept: CARDIOLOGY | Facility: CLINIC | Age: 49
End: 2019-08-21
Payer: COMMERCIAL

## 2019-08-21 VITALS
OXYGEN SATURATION: 99 % | HEIGHT: 59 IN | SYSTOLIC BLOOD PRESSURE: 119 MMHG | DIASTOLIC BLOOD PRESSURE: 72 MMHG | TEMPERATURE: 98.5 F | HEART RATE: 91 BPM | BODY MASS INDEX: 30.24 KG/M2 | RESPIRATION RATE: 12 BRPM | WEIGHT: 150 LBS

## 2019-08-21 PROCEDURE — 93000 ELECTROCARDIOGRAM COMPLETE: CPT

## 2019-08-21 PROCEDURE — 99204 OFFICE O/P NEW MOD 45 MIN: CPT | Mod: 25

## 2019-08-21 NOTE — HISTORY OF PRESENT ILLNESS
[FreeTextEntry1] : Aubree is a 48-year-old female hypertension s/p hospitalization for left facial droop. Etiology unclear. KBW=712. Blood pressure has been stable. Her blood pressure was not under good control about six months ago but has been stable for the last two months at least. Since her episode she still has trouble remembering and feels a little foggy in the head. She has had multiple ECHO in her life some say PFO and some say no PFO. This last one inconclusive. She had multiple miscarriages and has family history of miscarriages.

## 2019-08-21 NOTE — PHYSICAL EXAM
[Normal Appearance] : normal appearance [General Appearance - Well Developed] : well developed [Well Groomed] : well groomed [No Deformities] : no deformities [General Appearance - Well Nourished] : well nourished [General Appearance - In No Acute Distress] : no acute distress [Eyelids - No Xanthelasma] : the eyelids demonstrated no xanthelasmas [Normal Conjunctiva] : the conjunctiva exhibited no abnormalities [No Oral Pallor] : no oral pallor [Normal Oral Mucosa] : normal oral mucosa [Normal Jugular Venous A Waves Present] : normal jugular venous A waves present [No Oral Cyanosis] : no oral cyanosis [Normal Jugular Venous V Waves Present] : normal jugular venous V waves present [No Jugular Venous Whiteside A Waves] : no jugular venous whiteside A waves [Heart Sounds] : normal S1 and S2 [Heart Rate And Rhythm] : heart rate and rhythm were normal [Murmurs] : no murmurs present [Respiration, Rhythm And Depth] : normal respiratory rhythm and effort [Exaggerated Use Of Accessory Muscles For Inspiration] : no accessory muscle use [Auscultation Breath Sounds / Voice Sounds] : lungs were clear to auscultation bilaterally [Abdomen Soft] : soft [Abdomen Tenderness] : non-tender [Abdomen Mass (___ Cm)] : no abdominal mass palpated [Abnormal Walk] : normal gait [Gait - Sufficient For Exercise Testing] : the gait was sufficient for exercise testing [Nail Clubbing] : no clubbing of the fingernails [Cyanosis, Localized] : no localized cyanosis [Petechial Hemorrhages (___cm)] : no petechial hemorrhages [Skin Color & Pigmentation] : normal skin color and pigmentation [] : no rash [No Skin Ulcers] : no skin ulcer [Skin Lesions] : no skin lesions [No Venous Stasis] : no venous stasis [No Xanthoma] : no  xanthoma was observed [Affect] : the affect was normal [Oriented To Time, Place, And Person] : oriented to person, place, and time [Mood] : the mood was normal [No Anxiety] : not feeling anxious

## 2019-08-21 NOTE — DISCUSSION/SUMMARY
[FreeTextEntry1] : The patient is a 48-year-old female hypertension, with TIA of unclear etiology.\par #1 Htn- controlled on amlodipine, enalapril\par #2 Neuro- on aspirin but etiology remains unclear\par - ECHO inconclusive for PFO, ?ROQUE\par - sinus rhythm with normal LA size, two week event monitor, ?ILR\par - miscarriages- hematology evaluation, sent Protein C, S, ATIII, anticardiolipin antibody\par - carotid- doppler scheduled, if any disease then start statin\par #3 Lipids-  currently, HDL a little low, encourage daily walking to increase HDL

## 2019-08-22 LAB
AT III PPP CHRO-ACNC: 110 %
PROT C PPP CHRO-ACNC: 102 %

## 2019-08-23 LAB
CARDIOLIPIN IGM SER-MCNC: 7 GPL
CARDIOLIPIN IGM SER-MCNC: <5 MPL

## 2019-08-26 LAB — PROT S FREE PPP-ACNC: 73 % NORMAL

## 2019-09-26 ENCOUNTER — APPOINTMENT (OUTPATIENT)
Dept: CARDIOLOGY | Facility: CLINIC | Age: 49
End: 2019-09-26

## 2019-10-02 PROCEDURE — 99218: CPT

## 2019-10-03 PROCEDURE — 99217: CPT

## 2019-10-07 ENCOUNTER — APPOINTMENT (OUTPATIENT)
Dept: ELECTROPHYSIOLOGY | Facility: CLINIC | Age: 49
End: 2019-10-07
Payer: COMMERCIAL

## 2019-10-07 VITALS
BODY MASS INDEX: 30.44 KG/M2 | DIASTOLIC BLOOD PRESSURE: 84 MMHG | SYSTOLIC BLOOD PRESSURE: 122 MMHG | HEIGHT: 59 IN | WEIGHT: 151 LBS | OXYGEN SATURATION: 100 % | HEART RATE: 83 BPM

## 2019-10-07 PROCEDURE — 99204 OFFICE O/P NEW MOD 45 MIN: CPT

## 2019-10-07 PROCEDURE — 93000 ELECTROCARDIOGRAM COMPLETE: CPT

## 2019-10-07 PROCEDURE — 99214 OFFICE O/P EST MOD 30 MIN: CPT

## 2019-10-07 RX ORDER — AMLODIPINE BESYLATE 5 MG/1
5 TABLET ORAL
Qty: 30 | Refills: 0 | Status: DISCONTINUED | COMMUNITY
Start: 2017-01-17 | End: 2019-10-07

## 2019-10-07 NOTE — DISCUSSION/SUMMARY
[FreeTextEntry1] : In summary, this is a 48 year old woman with history of a recent TIA, HTN, and dyslipidemia. She is pending a ROQUE to assess for possible PFO. If that is negative, she would benefit from long-term monitoring with an ILR for surveillance of occult atrial fibrillation. We will schedule the ROQUE and the ILR implant for the same day and plan for ILR placement only if the ROQUE is unrevealing.\par \par Ms. Romero appeared to understand the whole discussion and verbalized that all of her questions were answered to her satisfaction. The rationale for the ILR implantation and well as the procedural risks--including hematoma and infection--were reviewed in detail. After consideration of this information, the decision was made to proceed with ILR implantation.\par \par Thank you for allowing me to be involved in the care of this pleasant woman. Please feel free to contact me with any questions.

## 2019-10-07 NOTE — REVIEW OF SYSTEMS
[see HPI] : see HPI [Negative] : Endocrine [Shortness Of Breath] : no shortness of breath [Palpitations] : no palpitations [Chest Pain] : no chest pain

## 2019-10-07 NOTE — HISTORY OF PRESENT ILLNESS
[FreeTextEntry1] : Referring Physician:\par \par Dear Dr. Nielsen:\par \par Ms. Romero was seen in the [facility] today. For our records, please allow me to summarize the history and my findings.\par \par This pleasant 48 year old woman has a cardiovascular history significant for TIA, HTN, and dyslipidemia. In August 2019, she suffered a TIA (facial droop, slurred speech, amnesia) and fully recovered with normal brain imaging.  She has a history of palpitations, but believes they are related to panic attacks. She wore a Zio Patch monitor in September 2019. She is pending a ROQUE to rule out a PFO.\par \par Ms. Romero denies any recent history of chest pain, shortness of breath, palpitations, dizziness, or syncope.

## 2019-10-07 NOTE — PHYSICAL EXAM
[Normal Appearance] : normal appearance [No Oral Pallor] : no oral pallor [Normal Conjunctiva] : the conjunctiva exhibited no abnormalities [Normal Jugular Venous V Waves Present] : normal jugular venous V waves present [Heart Rate And Rhythm] : heart rate and rhythm were normal [Heart Sounds] : normal S1 and S2 [Murmurs] : no murmurs present [Respiration, Rhythm And Depth] : normal respiratory rhythm and effort [Abdomen Soft] : soft [Auscultation Breath Sounds / Voice Sounds] : lungs were clear to auscultation bilaterally [Abdomen Tenderness] : non-tender [Abnormal Walk] : normal gait [Skin Turgor] : normal skin turgor [] : no rash [Oriented To Time, Place, And Person] : oriented to person, place, and time [Impaired Insight] : insight and judgment were intact [Affect] : the affect was normal

## 2019-10-09 ENCOUNTER — NON-APPOINTMENT (OUTPATIENT)
Age: 49
End: 2019-10-09

## 2019-10-09 ENCOUNTER — APPOINTMENT (OUTPATIENT)
Dept: CARDIOLOGY | Facility: CLINIC | Age: 49
End: 2019-10-09
Payer: COMMERCIAL

## 2019-10-09 VITALS
BODY MASS INDEX: 30.24 KG/M2 | WEIGHT: 150 LBS | RESPIRATION RATE: 12 BRPM | OXYGEN SATURATION: 100 % | HEART RATE: 84 BPM | HEIGHT: 59 IN | DIASTOLIC BLOOD PRESSURE: 79 MMHG | SYSTOLIC BLOOD PRESSURE: 118 MMHG

## 2019-10-09 PROCEDURE — 99214 OFFICE O/P EST MOD 30 MIN: CPT

## 2019-10-09 NOTE — PHYSICAL EXAM
[General Appearance - Well Developed] : well developed [Normal Appearance] : normal appearance [No Deformities] : no deformities [General Appearance - Well Nourished] : well nourished [Well Groomed] : well groomed [Normal Conjunctiva] : the conjunctiva exhibited no abnormalities [General Appearance - In No Acute Distress] : no acute distress [Eyelids - No Xanthelasma] : the eyelids demonstrated no xanthelasmas [No Oral Cyanosis] : no oral cyanosis [No Oral Pallor] : no oral pallor [Normal Oral Mucosa] : normal oral mucosa [Normal Jugular Venous A Waves Present] : normal jugular venous A waves present [Normal Jugular Venous V Waves Present] : normal jugular venous V waves present [No Jugular Venous Whiteside A Waves] : no jugular venous whiteside A waves [Respiration, Rhythm And Depth] : normal respiratory rhythm and effort [Exaggerated Use Of Accessory Muscles For Inspiration] : no accessory muscle use [Auscultation Breath Sounds / Voice Sounds] : lungs were clear to auscultation bilaterally [Heart Rate And Rhythm] : heart rate and rhythm were normal [Abdomen Soft] : soft [Heart Sounds] : normal S1 and S2 [Murmurs] : no murmurs present [Abdomen Mass (___ Cm)] : no abdominal mass palpated [Abdomen Tenderness] : non-tender [Abnormal Walk] : normal gait [Gait - Sufficient For Exercise Testing] : the gait was sufficient for exercise testing [Petechial Hemorrhages (___cm)] : no petechial hemorrhages [Cyanosis, Localized] : no localized cyanosis [Nail Clubbing] : no clubbing of the fingernails [Skin Color & Pigmentation] : normal skin color and pigmentation [] : no rash [No Venous Stasis] : no venous stasis [Skin Lesions] : no skin lesions [No Xanthoma] : no  xanthoma was observed [No Skin Ulcers] : no skin ulcer [Oriented To Time, Place, And Person] : oriented to person, place, and time [Affect] : the affect was normal [Mood] : the mood was normal [No Anxiety] : not feeling anxious

## 2019-10-09 NOTE — HISTORY OF PRESENT ILLNESS
[FreeTextEntry1] : Aubree is a 49-year-old female hypertension, TIA with left facial droop. Etiology unclear. MDU=276.Inconclusive TTE for PFO. Recently lightheaded and dizzy. Amlodipine stopped secondary to low BP with mild improvement. Seen by ENT as well. EP consult with Dr. Rodas.

## 2019-10-09 NOTE — DISCUSSION/SUMMARY
[FreeTextEntry1] : The patient is a 49-year-old female hypertension, with TIA of unclear etiology.\par #1 Htn- stable only on enalapril\par #2 Neuro- on aspirin but etiology remains unclear\par - ECHO inconclusive for PFO, ROQUE to be scheduleed\par - sinus rhythm with normal LA size, Dr. Byrnes will proceed with ILR if ROQUE negative\par - miscarriages- hematology evaluation, sent Protein C, S, ATIII, anticardiolipin antibody\par - carotid- doppler scheduled, if any disease then start statin\par #3 Lipids-  currently, HDL a little low, encourage daily walking to increase HDL

## 2019-10-10 ENCOUNTER — EMERGENCY (EMERGENCY)
Facility: HOSPITAL | Age: 49
LOS: 1 days | Discharge: HOME | End: 2019-10-10
Attending: STUDENT IN AN ORGANIZED HEALTH CARE EDUCATION/TRAINING PROGRAM | Admitting: EMERGENCY MEDICINE
Payer: COMMERCIAL

## 2019-10-10 VITALS
DIASTOLIC BLOOD PRESSURE: 83 MMHG | RESPIRATION RATE: 16 BRPM | OXYGEN SATURATION: 100 % | SYSTOLIC BLOOD PRESSURE: 142 MMHG | HEART RATE: 102 BPM | TEMPERATURE: 98 F

## 2019-10-10 DIAGNOSIS — Z98.89 OTHER SPECIFIED POSTPROCEDURAL STATES: Chronic | ICD-10-CM

## 2019-10-10 LAB
ALBUMIN SERPL ELPH-MCNC: 4.2 G/DL — SIGNIFICANT CHANGE UP (ref 3.3–5)
ALP SERPL-CCNC: 56 U/L — SIGNIFICANT CHANGE UP (ref 40–120)
ALT FLD-CCNC: 12 U/L — SIGNIFICANT CHANGE UP (ref 4–33)
ANION GAP SERPL CALC-SCNC: 10 MMO/L — SIGNIFICANT CHANGE UP (ref 7–14)
APPEARANCE UR: CLEAR — SIGNIFICANT CHANGE UP
AST SERPL-CCNC: 18 U/L — SIGNIFICANT CHANGE UP (ref 4–32)
BACTERIA # UR AUTO: SIGNIFICANT CHANGE UP
BASOPHILS # BLD AUTO: 0.04 K/UL — SIGNIFICANT CHANGE UP (ref 0–0.2)
BASOPHILS NFR BLD AUTO: 0.6 % — SIGNIFICANT CHANGE UP (ref 0–2)
BILIRUB SERPL-MCNC: < 0.2 MG/DL — LOW (ref 0.2–1.2)
BILIRUB UR-MCNC: NEGATIVE — SIGNIFICANT CHANGE UP
BLOOD UR QL VISUAL: NEGATIVE — SIGNIFICANT CHANGE UP
BUN SERPL-MCNC: 10 MG/DL — SIGNIFICANT CHANGE UP (ref 7–23)
CALCIUM SERPL-MCNC: 8.7 MG/DL — SIGNIFICANT CHANGE UP (ref 8.4–10.5)
CHLORIDE SERPL-SCNC: 101 MMOL/L — SIGNIFICANT CHANGE UP (ref 98–107)
CO2 SERPL-SCNC: 24 MMOL/L — SIGNIFICANT CHANGE UP (ref 22–31)
COLOR SPEC: YELLOW — SIGNIFICANT CHANGE UP
CREAT SERPL-MCNC: 0.54 MG/DL — SIGNIFICANT CHANGE UP (ref 0.5–1.3)
EOSINOPHIL # BLD AUTO: 0.26 K/UL — SIGNIFICANT CHANGE UP (ref 0–0.5)
EOSINOPHIL NFR BLD AUTO: 4.2 % — SIGNIFICANT CHANGE UP (ref 0–6)
GLUCOSE SERPL-MCNC: 77 MG/DL — SIGNIFICANT CHANGE UP (ref 70–99)
GLUCOSE UR-MCNC: NEGATIVE — SIGNIFICANT CHANGE UP
HCT VFR BLD CALC: 29.7 % — LOW (ref 34.5–45)
HGB BLD-MCNC: 9 G/DL — LOW (ref 11.5–15.5)
IMM GRANULOCYTES NFR BLD AUTO: 0.2 % — SIGNIFICANT CHANGE UP (ref 0–1.5)
KETONES UR-MCNC: NEGATIVE — SIGNIFICANT CHANGE UP
LEUKOCYTE ESTERASE UR-ACNC: NEGATIVE — SIGNIFICANT CHANGE UP
LYMPHOCYTES # BLD AUTO: 1.7 K/UL — SIGNIFICANT CHANGE UP (ref 1–3.3)
LYMPHOCYTES # BLD AUTO: 27.5 % — SIGNIFICANT CHANGE UP (ref 13–44)
MCHC RBC-ENTMCNC: 22.8 PG — LOW (ref 27–34)
MCHC RBC-ENTMCNC: 30.3 % — LOW (ref 32–36)
MCV RBC AUTO: 75.4 FL — LOW (ref 80–100)
MONOCYTES # BLD AUTO: 0.43 K/UL — SIGNIFICANT CHANGE UP (ref 0–0.9)
MONOCYTES NFR BLD AUTO: 6.9 % — SIGNIFICANT CHANGE UP (ref 2–14)
NEUTROPHILS # BLD AUTO: 3.75 K/UL — SIGNIFICANT CHANGE UP (ref 1.8–7.4)
NEUTROPHILS NFR BLD AUTO: 60.6 % — SIGNIFICANT CHANGE UP (ref 43–77)
NITRITE UR-MCNC: NEGATIVE — SIGNIFICANT CHANGE UP
NRBC # FLD: 0 K/UL — SIGNIFICANT CHANGE UP (ref 0–0)
PH UR: 7 — SIGNIFICANT CHANGE UP (ref 5–8)
PLATELET # BLD AUTO: 312 K/UL — SIGNIFICANT CHANGE UP (ref 150–400)
PMV BLD: 11 FL — SIGNIFICANT CHANGE UP (ref 7–13)
POTASSIUM SERPL-MCNC: 4.1 MMOL/L — SIGNIFICANT CHANGE UP (ref 3.5–5.3)
POTASSIUM SERPL-SCNC: 4.1 MMOL/L — SIGNIFICANT CHANGE UP (ref 3.5–5.3)
PROT SERPL-MCNC: 7.6 G/DL — SIGNIFICANT CHANGE UP (ref 6–8.3)
PROT UR-MCNC: SIGNIFICANT CHANGE UP
RBC # BLD: 3.94 M/UL — SIGNIFICANT CHANGE UP (ref 3.8–5.2)
RBC # FLD: 18.5 % — HIGH (ref 10.3–14.5)
SODIUM SERPL-SCNC: 135 MMOL/L — SIGNIFICANT CHANGE UP (ref 135–145)
SP GR SPEC: 1.03 — SIGNIFICANT CHANGE UP (ref 1–1.04)
SQUAMOUS # UR AUTO: SIGNIFICANT CHANGE UP
UROBILINOGEN FLD QL: SIGNIFICANT CHANGE UP
WBC # BLD: 6.19 K/UL — SIGNIFICANT CHANGE UP (ref 3.8–10.5)
WBC # FLD AUTO: 6.19 K/UL — SIGNIFICANT CHANGE UP (ref 3.8–10.5)
WBC UR QL: SIGNIFICANT CHANGE UP (ref 0–?)

## 2019-10-10 PROCEDURE — 70553 MRI BRAIN STEM W/O & W/DYE: CPT | Mod: 26

## 2019-10-10 RX ORDER — IBUPROFEN 200 MG
600 TABLET ORAL ONCE
Refills: 0 | Status: COMPLETED | OUTPATIENT
Start: 2019-10-10 | End: 2019-10-10

## 2019-10-10 RX ORDER — DIAZEPAM 5 MG
5 TABLET ORAL ONCE
Refills: 0 | Status: DISCONTINUED | OUTPATIENT
Start: 2019-10-10 | End: 2019-10-10

## 2019-10-10 RX ORDER — MECLIZINE HCL 12.5 MG
25 TABLET ORAL ONCE
Refills: 0 | Status: COMPLETED | OUTPATIENT
Start: 2019-10-10 | End: 2019-10-10

## 2019-10-10 RX ADMIN — Medication 25 MILLIGRAM(S): at 13:19

## 2019-10-10 RX ADMIN — Medication 5 MILLIGRAM(S): at 17:24

## 2019-10-10 RX ADMIN — Medication 600 MILLIGRAM(S): at 13:19

## 2019-10-10 NOTE — ED CDU PROVIDER INITIAL DAY NOTE - ATTENDING CONTRIBUTION TO CARE
I, Jennifer Cabot, MD, have performed a history and physical exam of the patient and discussed their management with the resident. I reviewed the resident's note and agree with the documented findings and plan of care. My medical decision making and observations are found above.    Cabot: 49F with PMH of HTN and TIA who comes in with 3 weeks of intermittent vertigo, worse today and for the first time, while walking.  No HA, numbness, weakness, speech changes, vision changes, facial droop.  No trauma.  Has been seeing an ENT for this with a normal workup thus far.  HDS, NAD, MMM, eyes clear, lungs CTAB, heart sounds normal, abd soft, NT, ND, no CVAT, LEs without edema, wwp, skin normal temperature and color, neuro: AAOx3, PERRLA, EOMIB, CN 2-12 intact, no nystagmus, 5/5 strength, SILT, no drift, ANDRES intact, finger to nose intact, gait instability but with effort, ambulates slowly in a straight line (after meclizine).  CT head negative.  EKG normal.  DDx includes central vs peripheral vertigo.  Will admit to CDU for MRI brain.

## 2019-10-10 NOTE — ED CDU PROVIDER INITIAL DAY NOTE - PHYSICAL EXAMINATION
HDS, NAD, MMM, eyes clear, lungs CTAB, heart sounds normal, abd soft, NT, ND, no CVAT, LEs without edema, wwp, skin normal temperature and color, neuro: AAOx3, PERRLA, EOMIB, CN 2-12 intact, no nystagmus, 5/5 strength, SILT, no drift, ANDRES intact, finger to nose intact, gait instability but with effort, ambulates slowly in a straight line (after meclizine)

## 2019-10-10 NOTE — ED CDU PROVIDER INITIAL DAY NOTE - OBJECTIVE STATEMENT
Cabot: 49F with PMH of HTN and TIA who comes in with 3 weeks of intermittent vertigo, worse today and for the first time, while walking.  No HA, numbness, weakness, speech changes, vision changes, facial droop.  No trauma.  Has been seeing an ENT for this with a normal workup thus far.  CT head negative.  EKG normal.  Will admit to CDU for MRI brain.

## 2019-10-10 NOTE — ED ADULT NURSE NOTE - OBJECTIVE STATEMENT
48 yo female, ambulatory, c/o dizziness x 3 weeks and back neck pain since this morning. pt states that she had cold like symptoms several weeks ago, and after that , dizziness started. pt was seen by ENT and told it was from virus. pt continues to have episodes of dizziness where she feels off balance and it will happen as she is walking. pt reports tenderness to back neck and states "I think I slept funny". pt reports hx of TIA this past summer. pt denies headache, blurry vision, chest pain, sob, n/v/d, abdominal tenderness, constipation, dysuria. LMP 10/8/19. vs as noted.

## 2019-10-10 NOTE — ED PROVIDER NOTE - ATTENDING CONTRIBUTION TO CARE
48yo F h/o TIA (facial droop), with normal MRI/MRA and echo, now p/w 2 weeks of unsteady gait, walking towards right, has been seen by ENT with no ear infection. also with paraspinal neck pain.  no nausea, no vomiting, no vision changes, no other neuro complaints  + vertiginous symptoms when changing positions  Gen: Well appearing in NAD  Head: NC/AT    Neck: trachea midline  Resp:  No distress  Ext: no deformities  Neuro:  A&O appears non focal  Skin:  Warm and dry as visualized  Psych:  Normal affect and mood     48yo F with unsteady gait - will get labs, treat symptomatically and dw neuro 50yo F h/o TIA (facial droop), with normal MRI/MRA and echo, now p/w 2 weeks of unsteady gait, walking towards right, symtpoms started after URI so sent has been seen by ENT with no ear infection. also with paraspinal neck pain.  no nausea, no vomiting, no vision changes, no other neuro complaints  + vertiginous symptoms when changing positions  Gen: Well appearing in NAD  Head: NC/AT  eye: perrl, eomi mild rightward nystagmus  Neck: trachea midline  Resp:  No distress  Ext: no deformities  Neuro:  A&O appears non focal, no dysmetria, no drift  Skin:  Warm and dry as visualized  Psych:  Normal affect and mood     50yo F with unsteady gait - will get labs, treat symptomatically and dw neuro 48yo F h/o TIA (facial droop), with normal MRI/MRA and echo, now p/w 2 weeks of unsteady gait, walking towards right, symtpoms started after URI so sent has been seen by ENT with no ear infection, but has follow up for ENG. also with paraspinal neck pain.  no nausea, no vomiting, no vision changes, no other neuro complaints  + vertiginous symptoms when changing positions  Gen: Well appearing in NAD  Head: NC/AT  eye: perrl, eomi mild rightward nystagmus  Neck: trachea midline  Resp:  No distress  Ext: no deformities  Neuro:  A&O appears non focal, no dysmetria, no drift  Skin:  Warm and dry as visualized  Psych:  Normal affect and mood     48yo F with unsteady gait - will get labs, treat symptomatically and dw neuro

## 2019-10-10 NOTE — CONSULT NOTE ADULT - SUBJECTIVE AND OBJECTIVE BOX
NEUROLOGY CONSULT   HPI: JAIDEN STONE is a 49y R-handed woman with a PMH of *** who presented on 10-10-19 with dizziness.       ROS: A 10-system ROS was performed and is negative except for those items noted above.     PMH:  Hypertension      Home Medications:  amLODIPine 5 mg oral tablet: 1 tab(s) orally once a day (2017 18:21)  enalapril 10 mg oral tablet: 1 tab(s) orally once a day (2017 18:21)    MEDICATIONS  (STANDING):    MEDICATIONS  (PRN):      ALLERGIES: No Known Allergies      PSH:   S/P breast lumpectomy  S/P  section      Social History:    FH:  No pertinent family history in first degree relatives      OBJECTIVE  Vital Signs Last 24 Hrs  T(C): 36.8 (10 Oct 2019 11:29), Max: 36.8 (10 Oct 2019 11:29)  T(F): 98.2 (10 Oct 2019 11:29), Max: 98.2 (10 Oct 2019 11:29)  HR: 86 (10 Oct 2019 12:29) (86 - 102)  BP: 147/106 (10 Oct 2019 12:29) (142/83 - 147/106)  BP(mean): --  RR: 16 (10 Oct 2019 12:29) (16 - 16)  SpO2: 100% (10 Oct 2019 12:29) (100% - 100%)  I&O's Summary      PHYSICAL EXAM:  General: female appears stated age, in NAD  Cardiac: S1, S2 normal. RRR with regular pulse.  No murmurs, rubs or gallops.  Respiratory: CTA throughout with good air entry.  MSK: No erythema, tenderness or deformities.   Skin: No rashes, lesions or color changes.  Neurologic:  Mental Status: Awake, alert, oriented to person, place, situation, time. Normal affect. Follows all commands.    Language: Speech is clear, fluent with preserved naming, repetition and comprehension.      Cranial Nerves: PERRLA (R = 3mm, L = 3mm). Visual fields intact. EOMI no nystagmus. V1-3 intact to light touch.  No facial asymmetry b/l, full eye closure strength b/l. Hearing grossly normal to conversation.  Symmetric palate elevation in midline.  Head turning & shoulder shrug intact b/l. Tongue midline, normal movements, no atrophy.  Motor: Normal muscle bulk & tone. No noticeable tremor, myoclonus or pronator drift. ***/5 strength.	  Sensation: Symmetric B/L preserved sensation to light touch, pin prick, position.    Cortical: No extinction on double simultaneous touch and no signs of neglect.   Reflexes: 2/4 throughout.  Plantar Responses are downgoing B/L  Coordination: Intact rapid-alternating movements. No dysmetria on finger-to-nose or heel-to-shin.  No dysdiadodyskinesia  Gait: Normal stance, slightly narrow based, normal stride length, touch off, arm swing and jenny.   Normal Romberg. No postural instability at rest.  Cannot tandem, requiring visual cues and to hold on to bed.      Psychiatric: Normal mood and congruent affect.     CBC:                        9.0    6.19  )-----------( 312      ( 10 Oct 2019 12:17 )             29.7       CMP:  10-10    135  |  101  |  10  ----------------------------<  77  4.1   |  24  |  0.54    Ca    8.7      10 Oct 2019 12:17    TPro  7.6  /  Alb  4.2  /  TBili  < 0.2<L>  /  DBili  x   /  AST  18  /  ALT  12  /  AlkPhos  56  10-10    LIVER FUNCTIONS - ( 10 Oct 2019 12:17 )  Alb: 4.2 g/dL / Pro: 7.6 g/dL / ALK PHOS: 56 u/L / ALT: 12 u/L / AST: 18 u/L / GGT: x             COAGS:      UA:  Urinalysis Basic - ( 10 Oct 2019 13:12 )    Color: YELLOW / Appearance: CLEAR / S.028 / pH: 7.0  Gluc: NEGATIVE / Ketone: NEGATIVE  / Bili: NEGATIVE / Urobili: TRACE   Blood: NEGATIVE / Protein: TRACE / Nitrite: NEGATIVE   Leuk Esterase: NEGATIVE / RBC: x / WBC 0-2   Sq Epi: OCC / Non Sq Epi: x / Bacteria: FEW    < from: MR Head No Cont (19 @ 02:47) >  Brain MRI:  Ventricles and sulci are within normal limits of size for age. Brain   parenchyma demonstrates generally unremarkable signal intensity and   morphology.   There is no intraparenchymal hematoma, mass effect or   midline shift. No areas of abnormal restrictive diffusion are present to   suggest acute or subacute infarction. There is no abnormal enhancement in   the brain parenchyma or meninges.  No abnormal extra-axial fluid   collections are present. There is a partially empty sella. The basal   cisterns are patent.    The calvarium is intact. Orbits demonstrate slight disconjugate gaze.   There is mucosal thickening within the maxillary, ethmoid, frontal, and   sphenoid sinuses with slightly hypoplastic sphenoid sinuses. The   tympanomastoid cavities appear free of acute disease. There is slight   loss of fatty T1 marrow signal throughout portions the calvarium, clivus,   and vertebral bodies, this is nonspecific and may reflect a marrow.   Process from anemias and or drug therapy. There is mild enlargement of   the nasopharyngeal adenoidal soft tissue greater than expected for age   with 8 mm right and 6 mm left small bilateral retropharyngeal lymph   nodes, likely reactive .      Brain MRA:  Internal carotid arteries demonstrate unremarkable antegrade flow related   enhancement to the [place] bilaterally. The anterior and middle   cerebral arteries  and anterior communicating artery are unremarkable.   There is no flow-limiting stenosis. The vertebral arteries are patent,   with a dominant left and smaller right vertebral artery that decreases in   size after the origin of the right posterior inferior several artery with   slight narrowing and an increase in size of the vessel, which is   otherwise patent to the vertebrobasilar confluence. Basilar artery, and   proximal posterior cerebral arteries and branch vasculature of the   posterior circulation is unremarkable.      Neck MRA:  The aortic arch and origins of the great vessels are patent and   unremarkable.    Common and Internal Carotids: The origin and course and caliber of the   common and internal carotid arteries is unremarkable.  There is no   significant stenosis by NASCET criteria along origin of either right or   left internal or external carotid artery.      Vertebral arteries:   The origin and course and caliber of the  vertebral arteries is patent,   with slight tortuosity, both vessels are patent to the intracranial   circulation and vertebrobasilar confluence.    There is a slightly enlarged and heterogeneous thyroid gland with   left-sided nodule, suggest thyroid ultrasound for improved assessment.      IMPRESSION:      Ventricles and sulci unremarkable in size. No restricted diffusion,   hemorrhage, or midline shift. There is no abnormal enhancement in the   brain parenchyma or meninges.    Intracranial and extracranial MR angiography is unremarkable by NASCET   criteria.    Enlarged heterogeneous thyroid gland, suggest ultrasound for improved   assessment.    < end of copied text > NEUROLOGY CONSULT   HPI: Mrs. Aubree Romero is a 49y R-handed woman with a PMH of HTN and TIA ( L. facial droop) on aspirin who presented on 10-10-19 with two weeks of dysequilibrium, worse with position changes, months of left ear tinnitus, right ear sensorineural hearing loss and one day of right lateropulsion.  PT notes gradual onset of dysequilibrium which described as sensation that everything is tilted down towards the right.  She denies any visual disturbances other than occasional episode of oscillopsia.  Admits to occasional internal vertigo.  States the episodes are always present when she lays down but stop when she lays still.  It is usually present when standing, but worsens when she is transitioning from sitting to standing.  Her gait disturbance with right lateropulsion was an acute change and the reason why she came in today.  Admits to mild neck tenderness and that her dysequilibrium worsens when she flexes forward.  Denies any nausea, vomiting, HA, fever, weight loss, night sweats.  Of note, she recently had been discontinued off amlodipine as her blood pressure was running low (low 100s systolic) and she has reduced her salt intake since the TIA in .  She denies any other dietary changes, eats ample fruits/vegetables and notes only one glass of wine a week.     She had a prior ENT evaluation as many of her dysequilibrium issues started after URI.  She was told she had age-appropriate right ear sensory hearing loss and possibly left ear vestibular dysfunction but was nondiagnostic and normal ear exam with a plan for ENG.  Her prior episode of TIA was a left facial droop which had resolved.  CT head and MRI brain at that time were normal.     ROS: A 10-system ROS was performed and is negative except for those items noted above.     PMH:  Hypertension    Home Medications:  enalapril 10 mg oral tablet: 1 tab(s) orally once a day (2017 18:21)  aspirin 81 mg daily    ALLERGIES: No Known Allergies    PSH:   S/P breast lumpectomy x 3 (cysts and 1 DCIS?)  S/P  section    Social History: Admits to 1 glass of wine weekly. Denies tobacco or drug use.     FH:  DM and HTN in both sides.    OBJECTIVE  Vital Signs Last 24 Hrs  T(C): 36.8 (10 Oct 2019 11:29), Max: 36.8 (10 Oct 2019 11:29)  T(F): 98.2 (10 Oct 2019 11:29), Max: 98.2 (10 Oct 2019 11:29)  HR: 86 (10 Oct 2019 12:29) (86 - 102)  BP: 147/106 (10 Oct 2019 12:29) (142/83 - 147/106)  BP(mean): --  RR: 16 (10 Oct 2019 12:29) (16 - 16)  SpO2: 100% (10 Oct 2019 12:29) (100% - 100%)  I&O's Summary    PHYSICAL EXAM:  General: Overweight female appears stated age, in NAD  HEENT: Normocephalic, atraumatic, tympanic membranes well visualized w/o bulging.  MSK: No erythema, tenderness or deformities.   Skin: No rashes, lesions or color changes.  Neurologic:  Mental Status: Awake, alert, oriented to person, place, situation, time. Normal affect. Follows all commands.  Language: Speech is clear, fluent with preserved naming, repetition and comprehension.    Cranial Nerves: PERRLA (R = 3mm, L = 3mm). Visual fields intact. EOMI.  Subtle horizontal nystagmus on left, right and vertical gaze, fatigable  V1-3 intact to light touch.  No facial asymmetry b/l, full eye closure strength b/l. Hearing grossly normal to conversation, slightly reduced on right to tuning fork.  Symmetric palate elevation in midline.  Head turning & shoulder shrug intact b/l. Tongue midline, normal movements, no atrophy.  Motor: Normal muscle bulk & tone. No noticeable tremor, myoclonus or pronator drift. 5/5 strength throughout.	  Sensation: Symmetric B/L preserved sensation to light touch, pin prick, position.    Cortical: No extinction on double simultaneous touch and no signs of neglect.   Reflexes: 2/4 throughout.  Plantar Responses are downgoing B/L  Coordination: Intact rapid-alternating movements. No dysmetria on finger-to-nose or heel-to-shin with eyes open or closed.  No dysdiadochokinesia.    Gait: Normal stance, slightly narrow based, normal stride length, touch off, arm swing and jenny.   Normal Romberg. No postural instability at rest.  Cannot tandem, requiring visual cues and to hold on to bed.  When eyes closed, has right lateropulsion walking forwards and backwards.   Psychiatric: Anxious mood and congruent affect.     HINTS: Corrective saccades on B/L testing, no skew.  [place]-Hallpike: No nystagmus noted, but PT felt symptomatic on B/L testing L>R.     CBC:                        9.0    6.19  )-----------( 312      ( 10 Oct 2019 12:17 )             29.7       CMP:  10-10    135  |  101  |  10  ----------------------------<  77  4.1   |  24  |  0.54    Ca    8.7      10 Oct 2019 12:17    TPro  7.6  /  Alb  4.2  /  TBili  < 0.2<L>  /  DBili  x   /  AST  18  /  ALT  12  /  AlkPhos  56  1010    UA:  Urinalysis Basic - ( 10 Oct 2019 13:12 )  Color: YELLOW / Appearance: CLEAR / S.028 / pH: 7.0  Gluc: NEGATIVE / Ketone: NEGATIVE  / Bili: NEGATIVE / Urobili: TRACE   Blood: NEGATIVE / Protein: TRACE / Nitrite: NEGATIVE   Leuk Esterase: NEGATIVE / RBC: x / WBC 0-2   Sq Epi: OCC / Non Sq Epi: x / Bacteria: FEW    19 MRI brain non contrast, MRA brain     IMPRESSION:  Ventricles and sulci unremarkable in size. No restricted diffusion,   hemorrhage, or midline shift. There is no abnormal enhancement in the   brain parenchyma or meninges.    Intracranial and extracranial MR angiography is unremarkable by NASCET   criteria.    Enlarged heterogeneous thyroid gland, suggest ultrasound for improved   assessment. NEUROLOGY CONSULT   HPI: Mrs. Aubree Romero is a 49y R-handed woman with a PMH of HTN and TIA ( L. facial droop) on aspirin who presented on 10-10-19 with two weeks of dysequilibrium, worse with position changes, months of left ear tinnitus, right ear sensorineural hearing loss and one day of right lateropulsion.  PT notes gradual onset of dysequilibrium which described as sensation that everything is tilted down towards the right.  She denies any visual disturbances other than occasional episode of oscillopsia.  Admits to occasional internal vertigo.  States the episodes are always present when she lays down but stop when she lays still.  It is usually present when standing, but worsens when she is transitioning from sitting to standing.  Her gait disturbance with right lateropulsion was an acute change and the reason why she came in today.  Admits to mild neck tenderness and that her dysequilibrium worsens when she flexes forward.  Denies any nausea, vomiting, HA, fever, weight loss, night sweats.  Of note, she recently had been discontinued off amlodipine as her blood pressure was running low (low 100s systolic) and she has reduced her salt intake since the TIA in .  She denies any other dietary changes, eats ample fruits/vegetables and notes only one glass of wine a week.     She had a prior ENT evaluation as many of her dysequilibrium issues started after URI.  She was told she had age-appropriate right ear sensory hearing loss and possibly left ear vestibular dysfunction but was nondiagnostic and normal ear exam with a plan for ENG.  Her prior episode of TIA was a left facial droop which had resolved.  CT head and MRI brain at that time were normal.     ROS: A 10-system ROS was performed and is negative except for those items noted above.     PMH:  Hypertension    Home Medications:  enalapril 10 mg oral tablet: 1 tab(s) orally once a day (2017 18:21)  aspirin 81 mg daily    ALLERGIES: No Known Allergies    PSH:   S/P breast lumpectomy x 3 (cysts and 1 DCIS?)  S/P  section    Social History: Admits to 1 glass of wine weekly. Denies tobacco or drug use.     FH:  DM and HTN in both sides.    OBJECTIVE  Vital Signs Last 24 Hrs  T(C): 36.8 (10 Oct 2019 11:29), Max: 36.8 (10 Oct 2019 11:29)  T(F): 98.2 (10 Oct 2019 11:29), Max: 98.2 (10 Oct 2019 11:29)  HR: 86 (10 Oct 2019 12:29) (86 - 102)  BP: 147/106 (10 Oct 2019 12:29) (142/83 - 147/106)  BP(mean): --  RR: 16 (10 Oct 2019 12:29) (16 - 16)  SpO2: 100% (10 Oct 2019 12:29) (100% - 100%)  I&O's Summary    PHYSICAL EXAM: [See Attending Attestation for additional relevant findings]  General: Overweight female appears stated age, in NAD  HEENT: Normocephalic, atraumatic, tympanic membranes well visualized w/o bulging.  MSK: No erythema, tenderness or deformities.   Skin: No rashes, lesions or color changes.  Neurologic:  Mental Status: Awake, alert, oriented to person, place, situation, time. Normal affect. Follows all commands.  Language: Speech is clear, fluent with preserved naming, repetition and comprehension.    Cranial Nerves: PERRLA (R = 3mm, L = 3mm). Visual fields intact. EOMI.  Subtle horizontal nystagmus on left, right and vertical gaze, fatigable  V1-3 intact to light touch.  No facial asymmetry b/l, full eye closure strength b/l. Hearing grossly normal to conversation, slightly reduced on right to tuning fork.  Symmetric palate elevation in midline.  Head turning & shoulder shrug intact b/l. Tongue midline, normal movements, no atrophy.  Motor: Normal muscle bulk & tone. No noticeable tremor, myoclonus or pronator drift. 5/5 strength throughout.	  Sensation: Symmetric B/L preserved sensation to light touch, pin prick, position.    Cortical: No extinction on double simultaneous touch and no signs of neglect.   Reflexes: 2/4 throughout.  Plantar Responses are downgoing B/L  Coordination: Intact rapid-alternating movements. No dysmetria on finger-to-nose or heel-to-shin with eyes open or closed.  No dysdiadochokinesia.    Gait: Normal stance, slightly narrow based, normal stride length, touch off, arm swing and jenny.   Normal Romberg. No postural instability at rest.  Cannot tandem, requiring visual cues and to hold on to bed.  When eyes closed, has right lateropulsion walking forwards and backwards.   Psychiatric: Anxious mood and congruent affect.     HINTS: Corrective saccades on B/L testing, no skew.  [place]-Hallpike: No nystagmus noted, but PT felt symptomatic on B/L testing L>R.     CBC:                        9.0    6.19  )-----------( 312      ( 10 Oct 2019 12:17 )             29.7       CMP:  1010    135  |  101  |  10  ----------------------------<  77  4.1   |  24  |  0.54    Ca    8.7      10 Oct 2019 12:17    TPro  7.6  /  Alb  4.2  /  TBili  < 0.2<L>  /  DBili  x   /  AST  18  /  ALT  12  /  AlkPhos  56  10-10    UA:  Urinalysis Basic - ( 10 Oct 2019 13:12 )  Color: YELLOW / Appearance: CLEAR / S.028 / pH: 7.0  Gluc: NEGATIVE / Ketone: NEGATIVE  / Bili: NEGATIVE / Urobili: TRACE   Blood: NEGATIVE / Protein: TRACE / Nitrite: NEGATIVE   Leuk Esterase: NEGATIVE / RBC: x / WBC 0-2   Sq Epi: OCC / Non Sq Epi: x / Bacteria: FEW    19 MRI brain non contrast, MRA brain     IMPRESSION:  Ventricles and sulci unremarkable in size. No restricted diffusion,   hemorrhage, or midline shift. There is no abnormal enhancement in the   brain parenchyma or meninges.    Intracranial and extracranial MR angiography is unremarkable by NASCET   criteria.    Enlarged heterogeneous thyroid gland, suggest ultrasound for improved   assessment.

## 2019-10-10 NOTE — ED PROVIDER NOTE - OBJECTIVE STATEMENT
49F, h.o TIA (8/2019), HTN, p.w disequilibrium and vertigo this am. patient states vertigo and unsteadiness for 2 wk and follow-up with ENT w.o any finding. symptoms worsens with laying and standing and associated with neck pain. Last TIA symptoms were left facial drooping and dysarthria. Pt has neg MRI and echo so far.

## 2019-10-10 NOTE — ED ADULT NURSE NOTE - NSIMPLEMENTINTERV_GEN_ALL_ED
Implemented All Fall Risk Interventions:  [employer] to call system. Call bell, personal items and telephone within reach. Instruct patient to call for assistance. Room bathroom lighting operational. Non-slip footwear when patient is off stretcher. Physically safe environment: no spills, clutter or unnecessary equipment. Stretcher in lowest position, wheels locked, appropriate side rails in place. Provide visual cue, wrist band, yellow gown, etc. Monitor gait and stability. Monitor for mental status changes and reorient to person, place, and time. Review medications for side effects contributing to fall risk. Reinforce activity limits and safety measures with patient and family.

## 2019-10-10 NOTE — ED PROVIDER NOTE - PHYSICAL EXAMINATION
General: NAD, good hygiene, well developed  HENT: Atraumatic, EOMI, no conjunctivae injection, moist mucosa  Neck: normal ROM and trachea midline, paraspinal cervical tenderness.   Cardiovascular: RRR, S1&2, no M or R, radial pulses equal and b/l  Respiratory: CTABL, no wheezes or crackles, no decreased breath sounds  Abdominal: soft and non-tender non distended, neg for guarding, rodriguez's sign, rovsing's sign, mcburney's sign, no CVA tenderness   Extremities: no edema of the legs/feet, DP/PT equal b/l  Skin: warm, well perfuse  Neuro: CN2-12 intact, EOMI. PERRLA, 5/5 strength in UE and LE b/l.  Sensation intact in UE/LE b/l. Neg for pronator drift, normal finger to nose, romberg, and gait skew to the right.   Psych: normal mood and affect

## 2019-10-10 NOTE — ED PROVIDER NOTE - CLINICAL SUMMARY MEDICAL DECISION MAKING FREE TEXT BOX
49F, h.o TIA, htb, p.w dysequilibrium and vertigo, worsens with movement. Neg MRI, and echo so far. unsteady gait on exam, vertigo worse with movement. paraspinal cervical tenderness, no meningitic sign and neg neuro exam.

## 2019-10-10 NOTE — CONSULT NOTE ADULT - ATTENDING COMMENTS
I have personally seen, examined, and participated in the care of this patient on rounds 10/11/19 with the neurology team.  I have reviewed all pertinent clinical information, including history, physical examination, assessment and plan.   Hx as reported above.  In addition I note that Pt reports she has to be careful when driving to turn en bloc when checking traffic to the L, rather than turning her head without also turning her torso, otherwise she risks neck pain or dizziness.   Furthermore, she tries to take the time, after awakening, to do neck exercises (gentle turning, flexing extending) but perhaps more often than not is "in a rush" and doesn't.      On examination I note the following in addition to, or instead of, the above:    There is tenderness to palpation of the upper trapezii bilaterally, and of the cervical extensors bilaterally.      Head turning to either side results in contralateral neck pain.      Immediately after passive head turning to the L followed by passive turning to the R, she develops vertigo (the same vertigo she has been feeling that  resulted in ED visit).      When she moves (sits up, stands up, walks) she guards against head movement.      PERRL; EOMI w smooth pursuit.  Confrontation visual fields intact.  No dysmetria on FNF; no tremor.  At this time can walk straight without veering to either side, but keeps head as motionless as possible (as if she were carrying a water jug on her head).      IMPRESSION / DISCUSSION / RECOMMENDATIONS    Cervical regional myofascial pain syndrome /cervicalgia, chronic, recurrent    Cervicogenic headaches.    Cervicogenic vertigo.    This is not labyrinthitis, nor BPPV; no steroid Rx.    PT for neck exercises.      Gentle massage, moist heat, OTC analgesics (NSAIDS if no medical contraindication, as oppsed to APAP), PRN.

## 2019-10-10 NOTE — CONSULT NOTE ADULT - ASSESSMENT
INCOMPLETE Assessment:  49y y/o ***-handed F with PMH of *** presenting with  likely due to ***    DDx include:     Plan  -Send B1, B12  -Vestibular therapy  -orthostatics     Assessment and plan discussed/not discussed with the attending, Dr. Nehemiah Alcantar, DO  PGY-2 Neurology Service INCOMPLETE Assessment:  49y R-handed F with PMH of HTN and TIA (08/11 L. facial droop) on aspirin who presented to the ED with two weeks of dysequilibrium, worse with position changes, months of left ear tinnitus, right ear sensorineural hearing loss and one day of right lateropulsion.  On exam, she has subtle horizontal nystagmus in B/L gaze and on vertical gaze, difficulties with tandem gait and right lateropulsion when walking forwards or backwards with dysequilibrium evoked by Sarah-Hallpike R>L.  CT head and MRI brain on recent admission were negative and recent ENT evaluation recommended ENG.       Plan  -MRI brain with and without contrast with thin cuts through the internal auditory canals to evaluate for vestibular organs.   -Send B1, B12, folate  -Orthostatic vital signs.  -ENT evaluation.      Assessment and plan discussed with the attending, Dr. Deborah Alcantar, DO  PGY-2 Neurology Service 49y R-handed F with PMH of HTN and TIA (08/11 L. facial droop) on aspirin who presented to the ED with two weeks of dysequilibrium, worse with position changes, months of left ear tinnitus, right ear sensorineural hearing loss and one day of right lateropulsion.  On exam, she has subtle horizontal nystagmus in B/L gaze and on vertical gaze, difficulties with tandem gait and right lateropulsion when walking forwards or backwards with dysequilibrium evoked by Sarah-Hallpike R>L.  CT head and MRI brain on recent admission were negative and recent ENT evaluation recommended ENG.       Impression: Positional dysequilibrium and right lateropulsion likely localizing to internal auditory canal on right.  Appears peripheral in nature, possibly labyrinthitis vs vestibular neuronitis.       Plan  -MRI brain with and without contrast with thin cuts through the internal auditory canals to evaluate for vestibular organs.   -Send B1, B12, folate  -Orthostatic vital signs.  -ENT evaluation.    -If negative MRI brain and orthostatics, can consider steroid course of for labyrinthitis including 10 day course of prednisone 60 mg daily days 1-5, 40 mg on day 6, 30 mg day 7, 20 mg day 8, 10 mg day 9 and 5 mg on day 10.      Assessment and plan discussed with the attending, Dr. Deborah Alcantar, DO  PGY-2 Neurology Service 49y R-handed F with PMH of HTN and TIA (08/11 L. facial droop) on aspirin who presented to the ED with two weeks of dysequilibrium, worse with position changes, months of left ear tinnitus, right ear sensorineural hearing loss and one day of right lateropulsion.  On exam, she has subtle horizontal nystagmus in B/L gaze and on vertical gaze, difficulties with tandem gait and right lateropulsion when walking forwards or backwards with dysequilibrium evoked by Sarah-Hallpike R>L.  CT head and MRI brain on recent admission were negative and recent ENT evaluation recommended ENG.       Impression: Positional dysequilibrium and right lateropulsion likely localizing to internal auditory canal on right.  Appears peripheral in nature, possibly labyrinthitis vs vestibular neuronitis.   [see Attending Attestation for final Dx]    Plan  -MRI brain with and without contrast with thin cuts through the internal auditory canals to evaluate for vestibular organs.   -Send B1, B12, folate  -Orthostatic vital signs.  -ENT evaluation.    -If negative MRI brain and orthostatics, can consider steroid course of for labyrinthitis including 10 day course of prednisone 60 mg daily days 1-5, 40 mg on day 6, 30 mg day 7, 20 mg day 8, 10 mg day 9 and 5 mg on day 10.      Assessment and plan discussed with the attending, Dr. Deborah Alcantar, DO  PGY-2 Neurology Service

## 2019-10-10 NOTE — ED CDU PROVIDER INITIAL DAY NOTE - MEDICAL DECISION MAKING DETAILS
Cabot: 49F with PMH of HTN and TIA who comes in with 3 weeks of intermittent vertigo, worse today and for the first time, while walking.  Exam with mild gait instability.  Will admit to CDU for MRI brain.

## 2019-10-10 NOTE — ED ADULT TRIAGE NOTE - CHIEF COMPLAINT QUOTE
C/o dizziness x2 weeks, "feels like Im on a zulma boat," seen by ENT scheduled for ENG on 10/23. Also c/o neck pain today. PMH TIA in Aug, HTN.

## 2019-10-10 NOTE — ED PROVIDER NOTE - PROGRESS NOTE DETAILS
Fito Valerio, PGY 2: spoke with the neur and will cdu for MRI, spoke with cdu, discussed anemia with patient and states she understands and will follow-up, no melena or hematuria. currently on menstrual Fito Valerio, PGY 2: spoke with cdu and will accept, will get a trial of benzo for symptoms

## 2019-10-11 VITALS
RESPIRATION RATE: 18 BRPM | HEART RATE: 88 BPM | OXYGEN SATURATION: 100 % | SYSTOLIC BLOOD PRESSURE: 122 MMHG | DIASTOLIC BLOOD PRESSURE: 74 MMHG | TEMPERATURE: 98 F

## 2019-10-11 PROCEDURE — 99243 OFF/OP CNSLTJ NEW/EST LOW 30: CPT

## 2019-10-11 RX ORDER — LIDOCAINE 4 G/100G
1 CREAM TOPICAL ONCE
Refills: 0 | Status: COMPLETED | OUTPATIENT
Start: 2019-10-11 | End: 2019-10-11

## 2019-10-11 RX ORDER — CYCLOBENZAPRINE HYDROCHLORIDE 10 MG/1
1 TABLET, FILM COATED ORAL
Qty: 12 | Refills: 0
Start: 2019-10-11 | End: 2019-10-14

## 2019-10-11 RX ORDER — ACETAMINOPHEN 500 MG
975 TABLET ORAL ONCE
Refills: 0 | Status: COMPLETED | OUTPATIENT
Start: 2019-10-11 | End: 2019-10-11

## 2019-10-11 RX ORDER — CYCLOBENZAPRINE HYDROCHLORIDE 10 MG/1
5 TABLET, FILM COATED ORAL ONCE
Refills: 0 | Status: COMPLETED | OUTPATIENT
Start: 2019-10-11 | End: 2019-10-11

## 2019-10-11 RX ORDER — CYCLOBENZAPRINE HYDROCHLORIDE 10 MG/1
5 TABLET, FILM COATED ORAL THREE TIMES A DAY
Refills: 0 | Status: DISCONTINUED | OUTPATIENT
Start: 2019-10-11 | End: 2019-10-19

## 2019-10-11 RX ORDER — MECLIZINE HCL 12.5 MG
1 TABLET ORAL
Qty: 15 | Refills: 0
Start: 2019-10-11 | End: 2019-10-15

## 2019-10-11 RX ADMIN — LIDOCAINE 1 PATCH: 4 CREAM TOPICAL at 07:15

## 2019-10-11 RX ADMIN — Medication 975 MILLIGRAM(S): at 08:12

## 2019-10-11 RX ADMIN — CYCLOBENZAPRINE HYDROCHLORIDE 5 MILLIGRAM(S): 10 TABLET, FILM COATED ORAL at 10:30

## 2019-10-11 RX ADMIN — CYCLOBENZAPRINE HYDROCHLORIDE 5 MILLIGRAM(S): 10 TABLET, FILM COATED ORAL at 00:21

## 2019-10-11 RX ADMIN — LIDOCAINE 1 PATCH: 4 CREAM TOPICAL at 00:22

## 2019-10-11 NOTE — ED CDU PROVIDER DISPOSITION NOTE - NSFOLLOWUPINSTRUCTIONS_ED_ALL_ED_FT
Advance activity as tolerated.  Continue all previously prescribed medications as directed unless otherwise instructed.  Take Tylenol 650mg (Two 325 mg pills) every 4-6 hours as needed for pain or fevers. Take Flexeril 5 mg every 8 hours as needed for muscle spasm -- causes drowsiness; no drinking alcohol or driving with this medication.  Apply warm compress to affected area for 15 minutes, 3-4 times per day.  Follow up with your primary care physician and neurology (referral list provided or you may follow up in the clinic, please call [phone])  in 48-72 hours- bring copies of your results.  Return to the ER for worsening or persistent symptoms, including but not limited to worsening/persistent pain, numbness, weakness, dizziness, falls, changes in vision or hearing, and/or ANY NEW OR CONCERNING SYMPTOMS. If you have issues obtaining follow up, please call: [phone] to obtain a doctor or specialist who takes your insurance in your area.  You may call [phone] to make an appointment with the internal medicine clinic.

## 2019-10-11 NOTE — ED CDU PROVIDER SUBSEQUENT DAY NOTE - PHYSICAL EXAMINATION
GEN APPEARANCE: WDWN, alert and cooperative, non-toxic appearing and in NAD  HEAD: Atraumatic, normocephalic   EYES: PERRLa, EOMI, vision grossly intact.   EARS: Gross hearing intact.   NOSE: No nasal discharge, no external evidence of epistaxis.   NECK: Supple L trap in spasm   CV: RRR, S1S2, no c/r/m/g. No cyanosis or pallor. Extremities warm, well perfused. Cap refill <2 seconds. No bruits.   LUNGS: CTAB. No wheezing. No rales. No rhonchi. No diminished breath sounds.   ABDOMEN: Soft, NTND. No guarding or rebound. No masses.   MSK: Spine appears normal, no spine point tenderness. No CVA ttp. No joint erythema or tenderness. Normal muscular development. Pelvis stable.  EXTREMITIES: No peripheral edema. No obvious joint or bony deformity.  NEURO: Alert, follows commands. Weight bearing normal. Speech normal. Sensation and motor normal x4 extremities.   SKIN: Normal color for race, warm, dry and intact. No evidence of rash.  PSYCH: Normal mood and affect.

## 2019-10-11 NOTE — ED CDU PROVIDER DISPOSITION NOTE - PATIENT PORTAL LINK FT
You can access the FollowMyHealth Patient Portal offered by [facility] by registering at the following website: [email or web address]. By joining [facility]’s FollowMyHealth portal, you will also be able to view your health information using other applications (apps) compatible with our system.

## 2019-10-11 NOTE — ED CDU PROVIDER SUBSEQUENT DAY NOTE - PROGRESS NOTE DETAILS
Patient seen by neurology this morning. MRI results were reviewed with patient by me earlier today and then by neurology again. Per neurology, read of AVM is an incidental finding, not requiring any action. Patient advised to take flexeril for muscle spasm and follow up as needed. Pt notes feeling better; able to walk independently without difficulty; pt notes continued left sided neck pain, which worsens with turning head left or right; mildly improved with lidocaine patch.  On exam, + muscle spasm palpable at left paraspinal musculature; no redness; no swelling; no warmth; no midline tenderness.  Pt denies any headache.  MRI shows "Redemonstration of punctate microhemorrhage in the left frontal lobe which is probably related to a small developmental venous anomaly. There is no associated cavernoma on T2-weighted images or evidence of an AVM nidus."  MRI reviewed by neurology who recommends no further intervention for findings; they state findings are incidental.  Pt medically stable for discharge.  Pt to be picked up by family member.  Reassessment performed and plan for discharge discussed with Dr. Cr who agrees with disposition and discharge plan.

## 2019-10-11 NOTE — ED CDU PROVIDER DISPOSITION NOTE - CLINICAL COURSE
Patient is a 48 yo F with history of HTN, evaluated for possible TIA/ CVA work up in August 2019, here in CDU for evaluation of dizziness with walking. Patient was seen by neurology and recommendation was for MRI with and without contrast. Patient had no change in speech, vision. Denies headache, fever, chest pain and sob. Patient seen by neurology this morning. MRI results were reviewed with patient by me earlier today and then by neurology again. Read is as follows: Stable exam from 08/07/2019. No acute intracranial pathology. Redemonstration of punctate microhemorrhage in the left frontal lobe which is probably related to a small developmental venous anomaly. There is no associated cavernoma on T2-weighted images or evidence of an AVM nidus. No new focus of intracranial hemorrhage is identified. Neurology to evaluate patient this morning. Per neurology, read of AVM is an incidental finding, not requiring any action. Patient advised to take flexeril for muscle spasm and follow up as needed. Patient is a 48 yo F with history of HTN, evaluated for possible TIA/ CVA work up in August 2019, here in CDU for evaluation of dizziness with walking. Patient was seen by neurology and recommendation was for MRI with and without contrast. Patient had no change in speech, vision. Denies headache, fever, chest pain and sob. Patient seen by neurology this morning. MRI results were reviewed with patient by me earlier today and then by neurology again. Read is as follows: Stable exam from 08/07/2019. No acute intracranial pathology. Redemonstration of punctate microhemorrhage in the left frontal lobe which is probably related to a small developmental venous anomaly. There is no associated cavernoma on T2-weighted images or evidence of an AVM nidus. No new focus of intracranial hemorrhage is identified. Neurology to evaluate patient this morning. Per neurology, read of AVM is an incidental finding, not requiring any action. Patient advised to take flexeril for muscle spasm thought to be cause of her neck pain and follow up as needed.

## 2019-10-11 NOTE — ED CDU PROVIDER SUBSEQUENT DAY NOTE - HISTORY
Cabot: 49F with PMH of HTN and TIA who comes in with 3 weeks of intermittent vertigo, worse today and for the first time, while walking.  No HA, numbness, weakness, speech changes, vision changes, facial droop.  No trauma.  Has been seeing an ENT for this with a normal workup thus far.  CT head negative.  EKG normal.  Will admit to CDU for MRI brain.    Chantell PGY3: Received sign out from my colleague Dr. Rodriguez, pt presented to ED with vertiginous sx, similar in presentation to earlier in year, at that time MRI normal, pt c/o worsening sx exacerbated by laying down and with neck pain. No acute events overnight, pt resting comfortably, c/o mild R posterior neck pain, given trial of flexeril and Lidoderm patch, pain improved and controlled thereafter, MRI read overnight stable from prior exam. Pending dispo in a.m.

## 2019-10-11 NOTE — ED CDU PROVIDER SUBSEQUENT DAY NOTE - ATTENDING CONTRIBUTION TO CARE
Patient is a 48 yo F with history of HTN, evaluated for possible TIA/ CVA work up in August 2019, here in CDU for evaluation of dizziness with walking. Patient was seen by neurology and recommendation was for MRI with and without contrast. Patient had no change in speech, vision. Denies headache, fever, chest pain and sob.   VS noted  Gen. no acute distress, Non toxic   HEENT: EOMI, mmm, left paracervical spasm, ttp  Lungs: CTAB/L no C/ W /R   CVS: RRR   Abd; Soft non tender, non distended   Ext: no edema  Skin: no rash  Neuro AAOx3, CN 2-12 intact, strength equal in UE/LE clear speech, normal gait  a/p: dizziness with walking - patient had MRI with and without contrast done. Read is as follows: Stable exam from 08/07/2019. No acute intracranial pathology. Redemonstration of punctate microhemorrhage in the left frontal lobe which is probably related to a small developmental venous anomaly. There is no associated cavernoma on T2-weighted images or evidence of an AVM nidus. No new focus of intracranial hemorrhage is identified. Neurology to evaluate patient this morning.

## 2019-10-11 NOTE — ED CDU PROVIDER SUBSEQUENT DAY NOTE - MEDICAL DECISION MAKING DETAILS
Chantell PGY3:   Chantell PGY3: Received sign out from my colleague Dr. Rodriguez, pt presented to ED with vertiginous sx, similar in presentation to earlier in year, at that time MRI normal, pt c/o worsening sx exacerbated by laying down and with neck pain. No acute events overnight, pt resting comfortably, c/o mild R posterior neck pain, given trial of flexeril and Lidoderm patch, pain improved and controlled thereafter, MRI read overnight stable from prior exam. Pending dispo in a.m.

## 2019-11-05 ENCOUNTER — APPOINTMENT (OUTPATIENT)
Dept: NEUROLOGY | Facility: CLINIC | Age: 49
End: 2019-11-05
Payer: COMMERCIAL

## 2019-11-05 VITALS
RESPIRATION RATE: 16 BRPM | HEIGHT: 59 IN | OXYGEN SATURATION: 100 % | DIASTOLIC BLOOD PRESSURE: 83 MMHG | SYSTOLIC BLOOD PRESSURE: 157 MMHG | HEART RATE: 107 BPM | WEIGHT: 153 LBS | BODY MASS INDEX: 30.84 KG/M2

## 2019-11-05 PROCEDURE — 99245 OFF/OP CONSLTJ NEW/EST HI 55: CPT

## 2019-11-06 ENCOUNTER — APPOINTMENT (OUTPATIENT)
Dept: NEUROLOGY | Facility: CLINIC | Age: 49
End: 2019-11-06
Payer: COMMERCIAL

## 2019-11-06 PROCEDURE — 95816 EEG AWAKE AND DROWSY: CPT

## 2019-11-06 PROCEDURE — 93880 EXTRACRANIAL BILAT STUDY: CPT

## 2019-11-06 PROCEDURE — 93892 TCD EMBOLI DETECT W/O INJ: CPT

## 2019-11-06 PROCEDURE — 93886 INTRACRANIAL COMPLETE STUDY: CPT

## 2019-11-08 ENCOUNTER — RESULT REVIEW (OUTPATIENT)
Age: 49
End: 2019-11-08

## 2019-11-09 NOTE — PHYSICAL EXAM
[General Appearance - In No Acute Distress] : in no acute distress [General Appearance - Alert] : alert [Oriented To Time, Place, And Person] : oriented to person, place, and time [General Appearance - Well Nourished] : well nourished [Person] : oriented to person [Time] : oriented to time [Place] : oriented to place [Fluency] : fluency intact [Comprehension] : comprehension intact [Cranial Nerves Oculomotor (III)] : extraocular motion intact [Cranial Nerves Optic (II)] : visual acuity intact bilaterally,  visual fields full to confrontation, pupils equal round and reactive to light [Cranial Nerves Trigeminal (V)] : facial sensation intact symmetrically [Cranial Nerves Facial (VII)] : face symmetrical [Cranial Nerves Vestibulocochlear (VIII)] : hearing was intact bilaterally [Cranial Nerves Hypoglossal (XII)] : there was no tongue deviation with protrusion [Cranial Nerves Accessory (XI - Cranial And Spinal)] : head turning and shoulder shrug symmetric [Cranial Nerves Glossopharyngeal (IX)] : tongue and palate midline [Motor Strength] : muscle strength was normal in all four extremities [Motor Tone] : muscle tone was normal in all four extremities [No Muscle Atrophy] : normal bulk in all four extremities [Motor Handedness Right-Handed] : the patient is right hand dominant [Sensation Tactile Decrease] : light touch was intact [Sensation Pain / Temperature Decrease] : pain and temperature was intact [Balance] : balance was intact [Abnormal Walk] : normal gait [Non-ambulatory] : Non-ambulatory [2+] : Ankle jerk right 2+ [Sclera] : the sclera and conjunctiva were normal [Extraocular Movements] : extraocular movements were intact [PERRL With Normal Accommodation] : pupils were equal in size, round, reactive to light, with normal accommodation [Optic Disc Abnormality] : the optic disc were normal in size and color [Outer Ear] : the ears and nose were normal in appearance [Oropharynx] : the oropharynx was normal [Neck Appearance] : the appearance of the neck was normal [Auscultation Breath Sounds / Voice Sounds] : lungs were clear to auscultation bilaterally [Heart Rate And Rhythm] : heart rate was normal and rhythm regular [Heart Sounds] : normal S1 and S2 [Arterial Pulses Carotid] : carotid pulses were normal with no bruits [Full Pulse] : the pedal pulses are present [Bowel Sounds] : normal bowel sounds [Abdomen Soft] : soft [Abdomen Tenderness] : non-tender [No CVA Tenderness] : no ~M costovertebral angle tenderness [No Spinal Tenderness] : no spinal tenderness [Nail Clubbing] : no clubbing  or cyanosis of the fingernails [Skin Color & Pigmentation] : normal skin color and pigmentation [Skin Turgor] : normal skin turgor [] : no rash [Paresis Pronator Drift Right-Sided] : no pronator drift on the right [Paresis Pronator Drift Left-Sided] : no pronator drift on the left [Motor Strength Upper Extremities Bilaterally] : strength was normal in both upper extremities [Motor Strength Lower Extremities Bilaterally] : strength was normal in both lower extremities [Romberg's Sign] : Romberg's sign was negtive [Allodynia] : no ~T allodynia present [Past-pointing] : there was no past-pointing [Tremor] : no tremor present [Dysdiadochokinesia Bilaterally] : not present [Coordination - Dysmetria Impaired Finger-to-Nose Bilateral] : not present [Coordination - Dysmetria Impaired Heel-to-Shin Bilateral] : not present [Plantar Reflex Right Only] : normal on the right [Plantar Reflex Left Only] : normal on the left [___] : absent on the right [___] : absent on the left [FreeTextEntry5] : No nystagmus. Negative [place]-Hallpike maneuver b/l. [FreeTextEntry8] : Normal, narrow-based gait. No difficulty with tiptoe and heel gaits. Some difficulty with tandem gait, but no loss of balance.

## 2019-11-09 NOTE — ASSESSMENT
[FreeTextEntry1] : 49 RHF with previous transient ischemic attack and new episodes of dizziness, not definitely vertigo based on description and lack of intracranial lesion on recent MRI Brain, as well as involuntary urination and speech changes, which may represent nonconvulsive seizures or hypotensive episodes.

## 2019-11-09 NOTE — DATA REVIEWED
[de-identified] : October 10, 2019:\par - Left frontal microhemorrhage, likely developmental venous anomaly\par - No cavernoma or arteriovenous malformation identified\par - Incidental lymphoid hypertrophy\par \par August 7, 2019:\par - No acute intracranial structural abnormalities\par - No vascular abnormalities on MRA Brain/Neck [de-identified] : Lipid panel (10/10/19): Chol 139, Trigly 33, HDL 39 <L>, \par \par Transthoracic Echocardiogram (8/7/19): LVEF 64%, Bubble study inconclusive for shunt\par \par Vestibulonystagmogram (10/23/19): Per report, normal study

## 2019-11-09 NOTE — HISTORY OF PRESENT ILLNESS
[FreeTextEntry1] : This is a 49-year-old right-handed woman who was hospitalized in August 2019 for a transient ischemic attack involving a left facial droop. She was seen in stroke follow-up clinic, where she was recommended to start aspirin 81mg daily and to have a cardiology evaluation given that results of inpatient Echocardiogram were inconclusive for patent foramen ovale.\par \par Starting in September 2019, she has been experiencing dizzy spells, characterized by things around her looking slanted, typically when she stands up. She saw her PCP, Dr. Mitul Palacios, on September 30, 2019, who found that her blood pressure was low ( sitting, SBP 98 standing), so she had one of her antihypertensives stopped and she was advised to have bed rest. SBP returned to the 110s to 120s in October, but she continued to have the dizzy spells on a daily basis. She presented to the ED on October 11, 2019, and was found to have right neck stiffness that may have been contributing to her symptoms, but normal MRI Brain. She has seen an otolaryngologist, Dr. Walter Farrell, in [place], [place], who found no significant problem based on a vestibulonystagmogram (VNG) done on October 23, 2019.\par \par On October 22, 2019, the patient had an episode of sudden urination without sensation of urgency and without alteration of consciousness or abnormal movements. Since November 2, 2019 (3 days ago), she has also been stuttering intermittently when talking (11/2/19).

## 2019-11-12 ENCOUNTER — APPOINTMENT (OUTPATIENT)
Dept: CV DIAGNOSITCS | Facility: HOSPITAL | Age: 49
End: 2019-11-12

## 2019-11-12 ENCOUNTER — OUTPATIENT (OUTPATIENT)
Dept: OUTPATIENT SERVICES | Facility: HOSPITAL | Age: 49
LOS: 1 days | End: 2019-11-12
Payer: COMMERCIAL

## 2019-11-12 ENCOUNTER — OTHER (OUTPATIENT)
Age: 49
End: 2019-11-12

## 2019-11-12 VITALS
DIASTOLIC BLOOD PRESSURE: 78 MMHG | HEIGHT: 58 IN | HEART RATE: 85 BPM | RESPIRATION RATE: 18 BRPM | OXYGEN SATURATION: 99 % | TEMPERATURE: 98 F | WEIGHT: 153 LBS | SYSTOLIC BLOOD PRESSURE: 128 MMHG

## 2019-11-12 DIAGNOSIS — I66.9 OCCLUSION AND STENOSIS OF UNSPECIFIED CEREBRAL ARTERY: ICD-10-CM

## 2019-11-12 DIAGNOSIS — Z98.89 OTHER SPECIFIED POSTPROCEDURAL STATES: Chronic | ICD-10-CM

## 2019-11-12 LAB
ALBUMIN SERPL ELPH-MCNC: 4.3 G/DL — SIGNIFICANT CHANGE UP (ref 3.3–5)
ALP SERPL-CCNC: 61 U/L — SIGNIFICANT CHANGE UP (ref 40–120)
ALT FLD-CCNC: 16 U/L — SIGNIFICANT CHANGE UP (ref 10–45)
ANION GAP SERPL CALC-SCNC: 9 MMOL/L — SIGNIFICANT CHANGE UP (ref 5–17)
APTT BLD: 27 SEC — LOW (ref 27.5–36.3)
AST SERPL-CCNC: 30 U/L — SIGNIFICANT CHANGE UP (ref 10–40)
BILIRUB SERPL-MCNC: 0.2 MG/DL — SIGNIFICANT CHANGE UP (ref 0.2–1.2)
BUN SERPL-MCNC: 8 MG/DL — SIGNIFICANT CHANGE UP (ref 7–23)
CALCIUM SERPL-MCNC: 9 MG/DL — SIGNIFICANT CHANGE UP (ref 8.4–10.5)
CHLORIDE SERPL-SCNC: 100 MMOL/L — SIGNIFICANT CHANGE UP (ref 96–108)
CO2 SERPL-SCNC: 26 MMOL/L — SIGNIFICANT CHANGE UP (ref 22–31)
CREAT SERPL-MCNC: 0.58 MG/DL — SIGNIFICANT CHANGE UP (ref 0.5–1.3)
GLUCOSE SERPL-MCNC: 86 MG/DL — SIGNIFICANT CHANGE UP (ref 70–99)
HCG SERPL-ACNC: 0.5 MIU/ML — SIGNIFICANT CHANGE UP
HCT VFR BLD CALC: 32.1 % — LOW (ref 34.5–45)
HGB BLD-MCNC: 9.7 G/DL — LOW (ref 11.5–15.5)
INR BLD: 1.03 RATIO — SIGNIFICANT CHANGE UP (ref 0.88–1.16)
MCHC RBC-ENTMCNC: 23.7 PG — LOW (ref 27–34)
MCHC RBC-ENTMCNC: 30.2 GM/DL — LOW (ref 32–36)
MCV RBC AUTO: 78.3 FL — LOW (ref 80–100)
NRBC # BLD: 0 /100 WBCS — SIGNIFICANT CHANGE UP (ref 0–0)
PLATELET # BLD AUTO: 280 K/UL — SIGNIFICANT CHANGE UP (ref 150–400)
POTASSIUM SERPL-MCNC: 4.5 MMOL/L — SIGNIFICANT CHANGE UP (ref 3.5–5.3)
POTASSIUM SERPL-SCNC: 4.5 MMOL/L — SIGNIFICANT CHANGE UP (ref 3.5–5.3)
PROT SERPL-MCNC: 7.8 G/DL — SIGNIFICANT CHANGE UP (ref 6–8.3)
PROTHROM AB SERPL-ACNC: 11.9 SEC — SIGNIFICANT CHANGE UP (ref 10–12.9)
RBC # BLD: 4.1 M/UL — SIGNIFICANT CHANGE UP (ref 3.8–5.2)
RBC # FLD: 21.7 % — HIGH (ref 10.3–14.5)
SODIUM SERPL-SCNC: 135 MMOL/L — SIGNIFICANT CHANGE UP (ref 135–145)
WBC # BLD: 4.94 K/UL — SIGNIFICANT CHANGE UP (ref 3.8–10.5)
WBC # FLD AUTO: 4.94 K/UL — SIGNIFICANT CHANGE UP (ref 3.8–10.5)

## 2019-11-12 PROCEDURE — 84702 CHORIONIC GONADOTROPIN TEST: CPT

## 2019-11-12 PROCEDURE — 33285 INSJ SUBQ CAR RHYTHM MNTR: CPT

## 2019-11-12 PROCEDURE — 85610 PROTHROMBIN TIME: CPT

## 2019-11-12 PROCEDURE — 93010 ELECTROCARDIOGRAM REPORT: CPT

## 2019-11-12 PROCEDURE — 93312 ECHO TRANSESOPHAGEAL: CPT | Mod: 26

## 2019-11-12 PROCEDURE — 93306 TTE W/DOPPLER COMPLETE: CPT | Mod: 26

## 2019-11-12 PROCEDURE — 93306 TTE W/DOPPLER COMPLETE: CPT

## 2019-11-12 PROCEDURE — 33285 INSJ SUBQ CAR RHYTHM MNTR: CPT | Mod: 59

## 2019-11-12 PROCEDURE — 93005 ELECTROCARDIOGRAM TRACING: CPT

## 2019-11-12 PROCEDURE — 85730 THROMBOPLASTIN TIME PARTIAL: CPT

## 2019-11-12 PROCEDURE — C1764: CPT

## 2019-11-12 PROCEDURE — 93312 ECHO TRANSESOPHAGEAL: CPT

## 2019-11-12 PROCEDURE — 80053 COMPREHEN METABOLIC PANEL: CPT

## 2019-11-12 PROCEDURE — 85027 COMPLETE CBC AUTOMATED: CPT

## 2019-11-12 RX ORDER — SODIUM CHLORIDE 9 MG/ML
3 INJECTION INTRAMUSCULAR; INTRAVENOUS; SUBCUTANEOUS EVERY 8 HOURS
Refills: 0 | Status: DISCONTINUED | OUTPATIENT
Start: 2019-11-12 | End: 2019-11-28

## 2019-11-12 RX ORDER — AMLODIPINE BESYLATE 2.5 MG/1
1 TABLET ORAL
Qty: 0 | Refills: 0 | DISCHARGE

## 2019-11-12 NOTE — H&P CARDIOLOGY - HISTORY OF PRESENT ILLNESS
48 year old woman has a cardiovascular history significant for TIA, HTN, and dyslipidemia. In August 2019, she suffered a TIA (facial droop, slurred speech, amnesia) and fully recovered with normal brain imaging. She has a history of palpitations, but believes they are related to panic attacks. She wore a Zio Patch monitor in September 2019.   Pt will also have a ROQUE to assess for possible PFO. If that is negative, she would benefit from long-term monitoring with an ILR for surveillance of occult atrial fibrillation.   Pt will have ILR placement only if the ROQUE is unrevealing.

## 2019-11-13 ENCOUNTER — MOBILE ON CALL (OUTPATIENT)
Age: 49
End: 2019-11-13

## 2019-11-20 ENCOUNTER — APPOINTMENT (OUTPATIENT)
Dept: ELECTROPHYSIOLOGY | Facility: CLINIC | Age: 49
End: 2019-11-20
Payer: COMMERCIAL

## 2019-11-20 VITALS
DIASTOLIC BLOOD PRESSURE: 86 MMHG | HEART RATE: 95 BPM | HEIGHT: 59 IN | SYSTOLIC BLOOD PRESSURE: 135 MMHG | OXYGEN SATURATION: 98 %

## 2019-11-20 PROCEDURE — 93291 INTERROG DEV EVAL SCRMS IP: CPT

## 2019-11-20 PROCEDURE — 99211 OFF/OP EST MAY X REQ PHY/QHP: CPT

## 2019-11-21 ENCOUNTER — APPOINTMENT (OUTPATIENT)
Dept: CARDIOLOGY | Facility: CLINIC | Age: 49
End: 2019-11-21
Payer: COMMERCIAL

## 2019-11-21 ENCOUNTER — NON-APPOINTMENT (OUTPATIENT)
Age: 49
End: 2019-11-21

## 2019-11-21 VITALS
HEIGHT: 59 IN | TEMPERATURE: 97.9 F | HEART RATE: 89 BPM | RESPIRATION RATE: 12 BRPM | SYSTOLIC BLOOD PRESSURE: 128 MMHG | BODY MASS INDEX: 31.04 KG/M2 | WEIGHT: 154 LBS | OXYGEN SATURATION: 99 % | DIASTOLIC BLOOD PRESSURE: 82 MMHG

## 2019-11-21 PROCEDURE — 99214 OFFICE O/P EST MOD 30 MIN: CPT

## 2019-11-21 NOTE — HISTORY OF PRESENT ILLNESS
[FreeTextEntry1] : Aubree is a 49-year-old female hypertension, TIA with left facial droop recently confirmed on MRI as [place] Palsy. She had her ILR last week.

## 2019-11-21 NOTE — DISCUSSION/SUMMARY
[___ Month(s)] : [unfilled] month(s) [FreeTextEntry1] : The patient is a 49-year-old female hypertension, with [place]'s Palsy confirmed.\par #1 Htn- stable only on enalapril\par #2 Neuro- on aspirin but final diagnosis [place] Palsy\par - no PFO\par - sinus rhythm with normal LA size, DILR implanted, obtain interrogation and interrogation next visit\par - miscarriages- hematology evaluation negative\par - carotid- doppler negative\par #3 Lipids-  currently, HDL a little low, encourage daily walking to increase HDL

## 2019-11-21 NOTE — PHYSICAL EXAM
[General Appearance - Well Developed] : well developed [Normal Appearance] : normal appearance [Well Groomed] : well groomed [General Appearance - Well Nourished] : well nourished [No Deformities] : no deformities [General Appearance - In No Acute Distress] : no acute distress [Normal Conjunctiva] : the conjunctiva exhibited no abnormalities [Eyelids - No Xanthelasma] : the eyelids demonstrated no xanthelasmas [Normal Oral Mucosa] : normal oral mucosa [No Oral Pallor] : no oral pallor [No Oral Cyanosis] : no oral cyanosis [Normal Jugular Venous A Waves Present] : normal jugular venous A waves present [Normal Jugular Venous V Waves Present] : normal jugular venous V waves present [No Jugular Venous Whiteside A Waves] : no jugular venous whiteside A waves [Respiration, Rhythm And Depth] : normal respiratory rhythm and effort [Exaggerated Use Of Accessory Muscles For Inspiration] : no accessory muscle use [Auscultation Breath Sounds / Voice Sounds] : lungs were clear to auscultation bilaterally [Heart Rate And Rhythm] : heart rate and rhythm were normal [Heart Sounds] : normal S1 and S2 [Murmurs] : no murmurs present [Abdomen Soft] : soft [Abdomen Tenderness] : non-tender [Abdomen Mass (___ Cm)] : no abdominal mass palpated [Abnormal Walk] : normal gait [Gait - Sufficient For Exercise Testing] : the gait was sufficient for exercise testing [Nail Clubbing] : no clubbing of the fingernails [Cyanosis, Localized] : no localized cyanosis [Petechial Hemorrhages (___cm)] : no petechial hemorrhages [Skin Color & Pigmentation] : normal skin color and pigmentation [] : no rash [No Venous Stasis] : no venous stasis [Skin Lesions] : no skin lesions [No Skin Ulcers] : no skin ulcer [No Xanthoma] : no  xanthoma was observed [Oriented To Time, Place, And Person] : oriented to person, place, and time [Affect] : the affect was normal [Mood] : the mood was normal [No Anxiety] : not feeling anxious

## 2019-11-22 PROBLEM — E78.5 HYPERLIPIDEMIA, UNSPECIFIED: Chronic | Status: ACTIVE | Noted: 2019-11-12

## 2019-12-13 ENCOUNTER — APPOINTMENT (OUTPATIENT)
Dept: ELECTROPHYSIOLOGY | Facility: CLINIC | Age: 49
End: 2019-12-13
Payer: COMMERCIAL

## 2019-12-13 PROCEDURE — 93298 REM INTERROG DEV EVAL SCRMS: CPT

## 2019-12-13 PROCEDURE — 93299: CPT

## 2020-01-08 NOTE — ED CDU PROVIDER INITIAL DAY NOTE - NS_OBSORDERDATE_ED_A_ED
"Knee arthritis. There is some evidence suggesting that glucosamine sulfate at 1500 mg daily might reduce pain associated with osteoarthritis, aka \"wear-and-tear arthritis\" and its possible that it might slow the ongoing damage to joints but well-designed studies have yet to confirm this.  There isn't much evidence that chondroitin is helpful.  Glucosamine should not be used by those with shellfish allergies.     You could also try topical arthritis creams (such as Bengay, Australian Dreams, Wiley Balm, ect).     For calf cramps, I suggest you try calf stretches before bed and in the morning.      Sleep apnea.  Schedule an office visit to discuss other treatment options.         Preventive Health Recommendations  Male Ages 50 - 64    Yearly exam:             See your health care provider every year in order to  o   Review health changes.   o   Discuss preventive care.    o   Review your medicines if your doctor has prescribed any.     Have a cholesterol test every 5 years, or more frequently if you are at risk for high cholesterol/heart disease.     Have a diabetes test (fasting glucose) every three years. If you are at risk for diabetes, you should have this test more often.     Have a colonoscopy at age 50, or have a yearly FIT test (stool test). These exams will check for colon cancer.      Talk with your health care provider about whether or not a prostate cancer screening test (PSA) is right for you.    You should be tested each year for STDs (sexually transmitted diseases), if you re at risk.     Shots: Get a flu shot each year. Get a tetanus shot every 10 years.     Nutrition:    Eat at least 5 servings of fruits and vegetables daily.     Eat whole-grain bread, whole-wheat pasta and brown rice instead of white grains and rice.     Get adequate Calcium and Vitamin D.     Lifestyle    Exercise for at least 150 minutes a week (30 minutes a day, 5 days a week). This will help you control your weight and prevent " disease.     Limit alcohol to one drink per day.     No smoking.     Wear sunscreen to prevent skin cancer.     See your dentist every six months for an exam and cleaning.     See your eye doctor every 1 to 2 years.    Patient Education     Arthritis: Exercise     Look for exercise classes for arthritis in your community.     Exercise is important to your overall health. It is especially important in people with arthritis. Regular exercise can:    Keep your heart and blood vessels healthy    Help with weight management, or weight loss    Improve your mood    Help prevent and manage health problems such as:  ? Diabetes  ? High blood pressure  ? High cholesterol  ? Depression  In people with arthritis, it offers all of those benefits and it can:    Lessen pain and stiffness    Strengthen muscles that support your joints    Help you to be able to do the things you enjoy  Exercise and arthritis  Exercise is an important part of any arthritis treatment plan. A complete program consists of the following 3 types of exercises:    Aerobic exercises for cardiovascular health and overall fitness.     Strengthening exercises to build up muscles to help prevent injury and keep joints stable.    Range-of-motion exercises to keep muscles and joints flexible.  Getting started  Talk with your healthcare provider about what is safe for you. Make sure you:    Learn how to do exercises correctly and safely. Consider talking with a physical therapist or  used to working with people with arthritis.    Start slowly and build. If you haven't been exercising, start slowly. Don't exercise too hard or too long.    Create a routine. Set aside specific times for exercise every day.    Warm up carefully. Take 5 to 10 minutes at the beginning and end of exercising to warm up and cool down. Just do the same exercises at a slower pace for 5 to 10 minutes.    Work at a comfortable, smooth pace. Move your joints gently to prevent  injury.    Pay attention to your body. Don't exercise a painful or swollen joint; switch to another activity. Follow the 2-hour pain rule: You did too much if your joint or muscle pain lasts 2 hours or more after exercising, or is worse the next day. This doesn't mean you should stop exercising. Just do less.  Aerobic exercise  Aerobic exercise improves overall health and helps control weight. Choose those that don't add extra stress to your joints. For example, walking, swimming, or bicycling.  Most people should exercise for at least 30 minutes, most days of the week. You don't have to exercise all at once. Try exercising for 10 minutes, 3 times a day, for example.  Strengthening exercises  Strengthening your muscles helps to protect your joints and prevent injuries. Try to do strengthening exercises 2 to 3 times a week:    These exercises can be done with exercise or resistance bands (inexpensive exercise aids that add resistance), or with light weights. Some people use soup cans as weights.    Isometric exercises are done by tightening the muscles without moving the joint. This may be a good way to strengthen the muscles around a stiff joint.  A physical therapist or  can teach you how to do these exercises.  Range-of-motion (ROM) exercises  Range-of-motion (ROM) exercises allow you to move each of your joints in every way they are intended to move. You should do ROM exercises for each joint 2 to 3 times a day. This will help you maintain full use of all of your joints.  Sample ROM exercises  The following are just a sample of ROM exercises--one for your neck, shoulders, elbows, hips, knees, and ankles. To completely move each joint through its full range of motion, you will have to do a few exercises for each joint. A physical therapist or  can teach you how to do full ROM exercises for each joint.   Repeat each for these exercises 5 to 10 times. Make sure you move slowly:  1. Neck turns. Sit in a  straight-backed chair. Look straight ahead. Slowly turn your head to the right, then return it to center. Repeat. Do the same thing, turning your head to the left. Repeat.  2. Shoulder raise. Lie on your back or sit in a chair. Raise one arm over your head, keeping your elbow straight. Keep the arm close to your ear. Return it slowly to your side. Repeat with your other arm.  3. Elbow stretch. Sit in a chair. If you are able, put both arms out to your sides to form a T. Slowly touch your shoulders with the tips of your fingers. Then return to the T-position. Repeat.  4. Hip stretch. Lie on your back with your legs straight and about 6 inches apart. With your foot flexed, slide your leg out to the side, then slide it back to the starting position. Repeat with your other leg.  5. Knee bend. Sit in a chair with your legs bent at the knees in front of you. Straighten one leg as much as you can, then bring it back to the floor. Repeat this 5 to 10 times. Then do the same thing with the other leg.   6. Ankle stretch. Sit with your feet flat on the floor. Lift your toes off of the floor while your heels stay down. Repeat. Then lift your heels off the floor while your toes stay down. Repeat.  Other exercise  Many other exercise and activities benefit people with arthritis. It is most important to find exercise and activities that you enjoy. You might try:    Yoga, including chair yoga, helps to keep your joints strong and flexible    Antonio Chi, an ancient type of exercise with slow, gentle movements    Water exercise, including water walking  For more information on exercise for arthritis go to the [employer] website: www.arthritis.org.  Date Last Reviewed: 6/1/2018 2000-2019 [employer]. [address] [place] [address], [place] [address]. All rights reserved. This information is not intended as a substitute for professional medical care. Always follow your healthcare professional's instructions.            06-Aug-2019 15:06

## 2020-01-13 ENCOUNTER — APPOINTMENT (OUTPATIENT)
Dept: ELECTROPHYSIOLOGY | Facility: CLINIC | Age: 50
End: 2020-01-13
Payer: COMMERCIAL

## 2020-01-13 PROCEDURE — G2066: CPT

## 2020-01-13 PROCEDURE — 93298 REM INTERROG DEV EVAL SCRMS: CPT

## 2020-01-15 ENCOUNTER — APPOINTMENT (OUTPATIENT)
Dept: NEUROLOGY | Facility: CLINIC | Age: 50
End: 2020-01-15
Payer: COMMERCIAL

## 2020-01-15 VITALS
HEART RATE: 87 BPM | DIASTOLIC BLOOD PRESSURE: 88 MMHG | HEIGHT: 59 IN | SYSTOLIC BLOOD PRESSURE: 137 MMHG | BODY MASS INDEX: 30.84 KG/M2 | WEIGHT: 153 LBS

## 2020-01-15 DIAGNOSIS — G51.0 BELL'S PALSY: ICD-10-CM

## 2020-01-15 PROCEDURE — 99215 OFFICE O/P EST HI 40 MIN: CPT

## 2020-01-15 PROCEDURE — 93892 TCD EMBOLI DETECT W/O INJ: CPT

## 2020-01-15 PROCEDURE — 93886 INTRACRANIAL COMPLETE STUDY: CPT

## 2020-02-02 NOTE — CONSULT NOTE ADULT - ASSESSMENT
Patient Education     Influenza (Child)    Influenza is also called the flu. It is a viral illness that affects the air passages of your lungs. It is different from the common cold. The flu can easily be passed from one to person to another. It may be spread through the air by coughing and sneezing. Or it can be spread by touching the sick person and then touching your own eyes, nose, or mouth.  Symptoms of the flu may be mild or severe. They can include extreme tiredness (wanting to stay in bed all day), chills, fevers, muscle aches, soreness with eye movement, headache, and a dry, hacking cough.  Your child usually won’t need to take antibiotics, unless he or she has a complication. This might be an ear or sinus infection or pneumonia.  Home care  Follow these guidelines when caring for your child at home:  · Fluids. Fever increases the amount of water your child loses from his or her body. For babies younger than 1 year old, keep giving regular feedings (formula or breast). Talk with your child’s healthcare provider to find out how much fluid your baby should be getting. If needed, give an oral rehydration solution. You can buy this at the grocery or pharmacy without a prescription. For a child older than 1 year, give him or her more fluids and continue his or her normal diet. If your child is dehydrated, give an oral rehydration solution. Go back to your child’s normal diet as soon as possible. If your child has diarrhea, don’t give juice, flavored gelatin water, soft drinks without caffeine, lemonade, fruit drinks, or popsicles. This may make diarrhea worse.  · Food. If your child doesn’t want to eat solid foods, it’s OK for a few days. Make sure your child drinks lots of fluid and has a normal amount of urine.  · Activity. Keep children with fever at home resting or playing quietly. Encourage your child to take naps. Your child may go back to  or school when the fever is gone for at least 24 hours.  The fever should be gone without giving your child acetaminophen or other medicine to reduce fever. Your child should also be eating well and feeling better.  · Sleep. It’s normal for your child to be unable to sleep or be irritable if he or she has the flu. A child who has congestion will sleep best with his or her head and upper body raised up. Or you can raise the head of the bed frame on a 6-inch block.  · Cough. Coughing is a normal part of the flu. You can use a cool mist humidifier at the bedside. Don’t give over-the-counter cough and cold medicines to children younger than 6 years of age, unless the healthcare provider tells you to do so. These medicines don’t help ease symptoms. And they can cause serious side effects, especially in babies younger than 2 years of age. Don’t allow anyone to smoke around your child. Smoke can make the cough worse.  · Nasal congestion. Use a rubber bulb syringe to suction the nose of a baby. You may put 2 to 3 drops of saltwater (saline) nose drops in each nostril before suctioning. This will help remove secretions. You can buy saline nose drops without a prescription. You can make the drops yourself by adding 1/4 teaspoon table salt to 1 cup of water.  · Fever. Use acetaminophen to control pain, unless another medicine was prescribed. In infants older than 6 months of age, you may use ibuprofen instead of acetaminophen. If your child has chronic liver or kidney disease, talk with your child’s provider before using these medicines. Also talk with the provider if your child has ever had a stomach ulcer or GI (gastrointestinal) bleeding. Don’t give aspirin to anyone younger than 18 years old who is ill with a fever. It may cause severe liver damage.  Follow-up care  Follow up with your child’s healthcare provider, or as advised.  When to seek medical advice  Call your child’s healthcare provider right away if any of these occur:  · Your child has a fever, as directed by the  healthcare provider, or:  ? Your child is younger than 12 weeks old and has a fever of 100.4°F (38°C) or higher. Your baby may need to be seen by a healthcare provider.  ? Your child has repeated fevers above 104°F (40°C) at any age.  ? Your child is younger than 2 years old and his or her fever continues for more than 24 hours.  ? Your child is 2 years old or older and his or her fever continues for more than 3 days.  · Fast breathing. In a child age 6 weeks to 2 years, this is more than 45 breaths per minute. In a child 3 to 6 years, this is more than 35 breaths per minute. In a child 7 to 10 years, this is more than 30 breaths per minute. In a child older than 10 years, this is more than 25 breaths per minute.  · Earache, sinus pain, stiff or painful neck, headache, or repeated diarrhea or vomiting  · Unusual fussiness, drowsiness, or confusion  · Your child doesn’t interact with you as he or she normally does  · Your child doesn’t want to be held  · Your child is not drinking enough fluid. This may show as no tears when crying, or \"sunken\" eyes or dry mouth. It may also be no wet diapers for 8 hours in a baby. Or it may be less urine than usual in older children.  · Rash with fever  Date Last Reviewed: 1/1/2017  © 2371-9304 [employer]. [address] [place] [address]. All rights reserved. This information is not intended as a substitute for professional medical care. Always follow your healthcare professional's instructions.           Patient Education     Influenza (Child)    Influenza is also called the flu. It is a viral illness that affects the air passages of your lungs. It is different from the common cold. The flu can easily be passed from one to person to another. It may be spread through the air by coughing and sneezing. Or it can be spread by touching the sick person and then touching your own eyes, nose, or mouth.  Symptoms of the flu may be mild or severe. They can include  extreme tiredness (wanting to stay in bed all day), chills, fevers, muscle aches, soreness with eye movement, headache, and a dry, hacking cough.  Your child usually won’t need to take antibiotics, unless he or she has a complication. This might be an ear or sinus infection or pneumonia.  Home care  Follow these guidelines when caring for your child at home:  · Fluids. Fever increases the amount of water your child loses from his or her body. For babies younger than 1 year old, keep giving regular feedings (formula or breast). Talk with your child’s healthcare provider to find out how much fluid your baby should be getting. If needed, give an oral rehydration solution. You can buy this at the grocery or pharmacy without a prescription. For a child older than 1 year, give him or her more fluids and continue his or her normal diet. If your child is dehydrated, give an oral rehydration solution. Go back to your child’s normal diet as soon as possible. If your child has diarrhea, don’t give juice, flavored gelatin water, soft drinks without caffeine, lemonade, fruit drinks, or popsicles. This may make diarrhea worse.  · Food. If your child doesn’t want to eat solid foods, it’s OK for a few days. Make sure your child drinks lots of fluid and has a normal amount of urine.  · Activity. Keep children with fever at home resting or playing quietly. Encourage your child to take naps. Your child may go back to  or school when the fever is gone for at least 24 hours. The fever should be gone without giving your child acetaminophen or other medicine to reduce fever. Your child should also be eating well and feeling better.  · Sleep. It’s normal for your child to be unable to sleep or be irritable if he or she has the flu. A child who has congestion will sleep best with his or her head and upper body raised up. Or you can raise the head of the bed frame on a 6-inch block.  · Cough. Coughing is a normal part of the flu. You  can use a cool mist humidifier at the bedside. Don’t give over-the-counter cough and cold medicines to children younger than 6 years of age, unless the healthcare provider tells you to do so. These medicines don’t help ease symptoms. And they can cause serious side effects, especially in babies younger than 2 years of age. Don’t allow anyone to smoke around your child. Smoke can make the cough worse.  · Nasal congestion. Use a rubber bulb syringe to suction the nose of a baby. You may put 2 to 3 drops of saltwater (saline) nose drops in each nostril before suctioning. This will help remove secretions. You can buy saline nose drops without a prescription. You can make the drops yourself by adding 1/4 teaspoon table salt to 1 cup of water.  · Fever. Use acetaminophen to control pain, unless another medicine was prescribed. In infants older than 6 months of age, you may use ibuprofen instead of acetaminophen. If your child has chronic liver or kidney disease, talk with your child’s provider before using these medicines. Also talk with the provider if your child has ever had a stomach ulcer or GI (gastrointestinal) bleeding. Don’t give aspirin to anyone younger than 18 years old who is ill with a fever. It may cause severe liver damage.  Follow-up care  Follow up with your child’s healthcare provider, or as advised.  When to seek medical advice  Call your child’s healthcare provider right away if any of these occur:  · Your child has a fever, as directed by the healthcare provider, or:  ? Your child is younger than 12 weeks old and has a fever of 100.4°F (38°C) or higher. Your baby may need to be seen by a healthcare provider.  ? Your child has repeated fevers above 104°F (40°C) at any age.  ? Your child is younger than 2 years old and his or her fever continues for more than 24 hours.  ? Your child is 2 years old or older and his or her fever continues for more than 3 days.  · Fast breathing. In a child age 6 weeks to  2 years, this is more than 45 breaths per minute. In a child 3 to 6 years, this is more than 35 breaths per minute. In a child 7 to 10 years, this is more than 30 breaths per minute. In a child older than 10 years, this is more than 25 breaths per minute.  · Earache, sinus pain, stiff or painful neck, headache, or repeated diarrhea or vomiting  · Unusual fussiness, drowsiness, or confusion  · Your child doesn’t interact with you as he or she normally does  · Your child doesn’t want to be held  · Your child is not drinking enough fluid. This may show as no tears when crying, or \"sunken\" eyes or dry mouth. It may also be no wet diapers for 8 hours in a baby. Or it may be less urine than usual in older children.  · Rash with fever  Date Last Reviewed: 1/1/2017  © 0618-7732 [employer]. [address] [place] [address], [place] [address]. All rights reserved. This information is not intended as a substitute for professional medical care. Always follow your healthcare professional's instructions.            48Y RH AA Female with PMH HTN who presented with L sided facial droop. Was feeling nauseous and began dry heaving. Went to work and had her BP checked which she stated was 120s systolic. Noticed to have L sided facial droop at work. She was initially evaluated by ED and was thought to have Bell's palsy. Upon re-evaluation, was noticed to be speaking differently and less dysarthric than prior. Patient also stated she had gait instability which was later explained as "floating on air" but did not have any actual weakness or numbness in her extremities. Code stroke called for new information- dysarthria and gait instability. CT Head was unremarkable for acute intracranial pathology. NIHSS: 0 (mild sensory loss of L V3 distribution)), MRS: 0. Exam remarkable for slight L face sensory loss in the lower face, which patient states improved upon re-examination. No dysarthria or other focal neurologic deficits noted.     Impression:  L sided facial droop, numbness and dysarthria likely secondary to TIA given HTN     Plan:  MRI brain without contrast  MRA head without contrast and neck with contrast  TTE with bubble study for possible valvular heart disease  ILR evaluation inpatient vs outpatient   ASA 81 mg PO QD  Lipitor 80 mg PO QHS  DVT prophylaxis with heparin/lovenox  HbA1c, LDL and continue with aggressive vascular risk factors modifications   Tele monitor    To be discussed with stroke attending 48Y RH AA Female with PMH HTN who presented with L sided facial droop. Was feeling nauseous and began dry heaving. Went to work and had her BP checked which she stated was 120s systolic. Noticed to have L sided facial droop at work. She was initially evaluated by ED and was thought to have Bell's palsy. Upon re-evaluation, was noticed to be speaking differently and less dysarthric than prior. Patient also stated she had gait instability which was later explained as "floating on air" but did not have any actual weakness or numbness in her extremities. Code stroke called for new information- dysarthria and gait instability. CT Head was unremarkable for acute intracranial pathology. NIHSS: 0 (mild sensory loss of L V3 distribution)), MRS: 0. Exam remarkable for slight L face sensory loss in the lower face, which patient states improved upon re-examination. No dysarthria or other focal neurologic deficits noted.     Impression:  TIA secondary to unknown etiology. Likely due to small vessel disease in setting of HTN    Plan:  MRI brain without contrast  MRA head without contrast and neck with contrast  TTE with bubble study for possible valvular heart disease  ABCD2 score of 3 - will start ASA 81mg QD and Plavix 75mg QD for 3 weeks (with ASA 81mg alone after that)  Lipitor 80 mg PO QHS, titrate based on lipid profile   DVT prophylaxis with heparin/lovenox  HbA1c, LDL and continue with aggressive vascular risk factors modifications   Tele monitor    To be discussed with stroke attending 48Y RH AA Female with PMH HTN who presented with L sided facial droop. Was feeling nauseous and began dry heaving. Went to work and had her BP checked which she stated was 120s systolic. Noticed to have L sided facial droop at work. She was initially evaluated by ED and was thought to have Bell's palsy. Upon re-evaluation, was noticed to be speaking differently and less dysarthric than prior. Patient also stated she had gait instability which was later explained as "floating on air" but did not have any actual weakness or numbness in her extremities. Code stroke called for new information- dysarthria and gait instability. CT Head was unremarkable for acute intracranial pathology. NIHSS: 0 (mild sensory loss of L CN V3 distribution)), MRS: 0. Exam remarkable for slight L face sensory loss in the lower face, which patient states improved upon re-examination. No dysarthria or other focal neurologic deficits noted.     Impression:  TIA secondary to unknown etiology. Likely due to small vessel disease in setting of HTN    Plan:  MRI brain without contrast  MRA head without contrast and neck with contrast  TTE with bubble study for possible valvular heart disease  ABCD2 score of 3 - will start ASA 81mg QD and Plavix 75mg QD for 3 weeks (with ASA 81mg alone after that)  Lipitor 80 mg PO QHS, titrate based on lipid profile   DVT prophylaxis with heparin/lovenox  HbA1c, LDL and continue with aggressive vascular risk factors modifications   Tele monitor    To be discussed with stroke attending 48Y RH AA Female with PMH HTN who presented with L sided facial droop. Was feeling nauseous and began dry heaving. Went to work and had her BP checked which she stated was 120s systolic. Noticed to have L sided facial droop at work. She was initially evaluated by ED and was thought to have Bell's palsy. Upon re-evaluation, was noticed to be speaking differently and less dysarthric than prior. Patient also stated she had gait instability which was later explained as "floating on air" but did not have any actual weakness or numbness in her extremities. Code stroke called for new information- dysarthria and gait instability. CT Head was unremarkable for acute intracranial pathology. NIHSS: 0 (mild sensory loss of L CN V3 distribution)), MRS: 0. Exam remarkable for slight L face sensory loss in the lower face, which patient states improved upon re-examination. No dysarthria or other focal neurologic deficits noted.     Impression:  TIA secondary to unknown etiology vs complex migraine and less likely seizure     Plan:  MRI brain without contrast  MRA head without contrast and neck with contrast  TTE with bubble study for possible valvular heart disease  ABCD2 score of 3 - will start ASA 81mg QD and consider Plavix 75mg QD for 3 weeks (with ASA 81mg alone after that)  Lipitor 80 mg PO QHS, titrate based on lipid profile   DVT prophylaxis with heparin/lovenox  HbA1c, LDL and continue with aggressive vascular risk factors modifications   Tele monitor

## 2020-02-13 ENCOUNTER — APPOINTMENT (OUTPATIENT)
Dept: ELECTROPHYSIOLOGY | Facility: CLINIC | Age: 50
End: 2020-02-13
Payer: COMMERCIAL

## 2020-02-13 PROCEDURE — G2066: CPT

## 2020-02-13 PROCEDURE — 93296 REM INTERROG EVL PM/IDS: CPT

## 2020-03-16 ENCOUNTER — APPOINTMENT (OUTPATIENT)
Dept: ELECTROPHYSIOLOGY | Facility: CLINIC | Age: 50
End: 2020-03-16
Payer: COMMERCIAL

## 2020-03-16 PROCEDURE — 93298 REM INTERROG DEV EVAL SCRMS: CPT

## 2020-03-16 PROCEDURE — G2066: CPT

## 2020-04-16 ENCOUNTER — APPOINTMENT (OUTPATIENT)
Dept: ELECTROPHYSIOLOGY | Facility: CLINIC | Age: 50
End: 2020-04-16
Payer: COMMERCIAL

## 2020-04-16 PROCEDURE — 93298 REM INTERROG DEV EVAL SCRMS: CPT

## 2020-04-16 PROCEDURE — G2066: CPT

## 2020-05-18 ENCOUNTER — APPOINTMENT (OUTPATIENT)
Dept: ELECTROPHYSIOLOGY | Facility: CLINIC | Age: 50
End: 2020-05-18
Payer: COMMERCIAL

## 2020-05-18 PROCEDURE — G2066: CPT

## 2020-05-18 PROCEDURE — 93298 REM INTERROG DEV EVAL SCRMS: CPT

## 2020-05-28 ENCOUNTER — TRANSCRIPTION ENCOUNTER (OUTPATIENT)
Age: 50
End: 2020-05-28

## 2020-05-28 ENCOUNTER — APPOINTMENT (OUTPATIENT)
Dept: CARDIOLOGY | Facility: CLINIC | Age: 50
End: 2020-05-28

## 2020-06-01 ENCOUNTER — NON-APPOINTMENT (OUTPATIENT)
Age: 50
End: 2020-06-01

## 2020-06-01 ENCOUNTER — APPOINTMENT (OUTPATIENT)
Dept: CARDIOLOGY | Facility: CLINIC | Age: 50
End: 2020-06-01
Payer: COMMERCIAL

## 2020-06-01 VITALS — SYSTOLIC BLOOD PRESSURE: 118 MMHG | DIASTOLIC BLOOD PRESSURE: 80 MMHG

## 2020-06-01 VITALS
RESPIRATION RATE: 12 BRPM | OXYGEN SATURATION: 98 % | HEIGHT: 59 IN | WEIGHT: 154 LBS | BODY MASS INDEX: 31.04 KG/M2 | DIASTOLIC BLOOD PRESSURE: 85 MMHG | HEART RATE: 92 BPM | SYSTOLIC BLOOD PRESSURE: 130 MMHG

## 2020-06-01 PROCEDURE — 99214 OFFICE O/P EST MOD 30 MIN: CPT

## 2020-06-01 NOTE — DISCUSSION/SUMMARY
[___ Month(s)] : [unfilled] month(s) [FreeTextEntry1] : The patient is a 49-year-old female hypertension, Bell's Palsy with ILR demonstrating isnus tachycardia and APC/VPC responding to low dose toprol.\par #1 Htn- stable only on enalapril, repeat blood pressures confirm readings\par #2 Neuro- on aspirin but final diagnosis [place] Palsy\par - no PFO\par - sinus rhythm with normal LA size, ILR with sinus tachy and APC/VPC\par - miscarriages- hematology evaluation negative\par - carotid- doppler negative\par #3 Lipids-  currently, HDL a little low, encourage daily walking to increase HDL\par #4 EP- continue toprol 12.5mg daily, still sinus tach secondary to anxiety, stress management discussed

## 2020-06-01 NOTE — HISTORY OF PRESENT ILLNESS
[FreeTextEntry1] : Aubree is a 49-year-old female hypertension, TIA with left facial droop recently confirmed on MRI as [place] Palsy who had been experiencing paliptations. They have markedly improved with the addition of toprol. She is very nervous and worried that her BP goes into the 130s now at home.

## 2020-06-01 NOTE — PHYSICAL EXAM
[General Appearance - Well Developed] : well developed [No Deformities] : no deformities [Well Groomed] : well groomed [General Appearance - Well Nourished] : well nourished [Normal Appearance] : normal appearance [Normal Conjunctiva] : the conjunctiva exhibited no abnormalities [General Appearance - In No Acute Distress] : no acute distress [No Oral Pallor] : no oral pallor [Eyelids - No Xanthelasma] : the eyelids demonstrated no xanthelasmas [Normal Oral Mucosa] : normal oral mucosa [No Oral Cyanosis] : no oral cyanosis [Normal Jugular Venous V Waves Present] : normal jugular venous V waves present [Normal Jugular Venous A Waves Present] : normal jugular venous A waves present [No Jugular Venous Whiteside A Waves] : no jugular venous whiteside A waves [Exaggerated Use Of Accessory Muscles For Inspiration] : no accessory muscle use [Respiration, Rhythm And Depth] : normal respiratory rhythm and effort [Auscultation Breath Sounds / Voice Sounds] : lungs were clear to auscultation bilaterally [Heart Rate And Rhythm] : heart rate and rhythm were normal [Murmurs] : no murmurs present [Heart Sounds] : normal S1 and S2 [Abdomen Tenderness] : non-tender [Abdomen Soft] : soft [Abnormal Walk] : normal gait [Abdomen Mass (___ Cm)] : no abdominal mass palpated [Gait - Sufficient For Exercise Testing] : the gait was sufficient for exercise testing [Nail Clubbing] : no clubbing of the fingernails [Petechial Hemorrhages (___cm)] : no petechial hemorrhages [Cyanosis, Localized] : no localized cyanosis [Skin Color & Pigmentation] : normal skin color and pigmentation [] : no rash [No Venous Stasis] : no venous stasis [Skin Lesions] : no skin lesions [No Skin Ulcers] : no skin ulcer [No Xanthoma] : no  xanthoma was observed [Oriented To Time, Place, And Person] : oriented to person, place, and time [Affect] : the affect was normal [Mood] : the mood was normal [No Anxiety] : not feeling anxious

## 2020-06-18 ENCOUNTER — APPOINTMENT (OUTPATIENT)
Dept: ELECTROPHYSIOLOGY | Facility: CLINIC | Age: 50
End: 2020-06-18
Payer: COMMERCIAL

## 2020-06-18 PROCEDURE — 93298 REM INTERROG DEV EVAL SCRMS: CPT

## 2020-06-18 PROCEDURE — G2066: CPT

## 2020-06-21 ENCOUNTER — RESULT REVIEW (OUTPATIENT)
Age: 50
End: 2020-06-21

## 2020-07-23 ENCOUNTER — APPOINTMENT (OUTPATIENT)
Dept: ELECTROPHYSIOLOGY | Facility: CLINIC | Age: 50
End: 2020-07-23
Payer: COMMERCIAL

## 2020-07-23 PROCEDURE — G2066: CPT

## 2020-07-23 PROCEDURE — 93298 REM INTERROG DEV EVAL SCRMS: CPT

## 2020-08-24 ENCOUNTER — APPOINTMENT (OUTPATIENT)
Dept: ELECTROPHYSIOLOGY | Facility: CLINIC | Age: 50
End: 2020-08-24
Payer: COMMERCIAL

## 2020-08-24 PROCEDURE — 93298 REM INTERROG DEV EVAL SCRMS: CPT

## 2020-08-24 PROCEDURE — G2066: CPT

## 2020-09-29 ENCOUNTER — APPOINTMENT (OUTPATIENT)
Dept: ELECTROPHYSIOLOGY | Facility: CLINIC | Age: 50
End: 2020-09-29
Payer: COMMERCIAL

## 2020-09-29 PROCEDURE — G2066: CPT

## 2020-09-29 PROCEDURE — 93298 REM INTERROG DEV EVAL SCRMS: CPT

## 2020-10-01 ENCOUNTER — APPOINTMENT (OUTPATIENT)
Dept: CARDIOLOGY | Facility: CLINIC | Age: 50
End: 2020-10-01
Payer: COMMERCIAL

## 2020-10-01 ENCOUNTER — NON-APPOINTMENT (OUTPATIENT)
Age: 50
End: 2020-10-01

## 2020-10-01 VITALS
DIASTOLIC BLOOD PRESSURE: 75 MMHG | RESPIRATION RATE: 12 BRPM | TEMPERATURE: 97.3 F | WEIGHT: 162 LBS | BODY MASS INDEX: 32.72 KG/M2 | SYSTOLIC BLOOD PRESSURE: 118 MMHG | HEART RATE: 84 BPM | OXYGEN SATURATION: 98 %

## 2020-10-01 DIAGNOSIS — K21.9 GASTRO-ESOPHAGEAL REFLUX DISEASE W/OUT ESOPHAGITIS: ICD-10-CM

## 2020-10-01 PROCEDURE — 93000 ELECTROCARDIOGRAM COMPLETE: CPT

## 2020-10-01 PROCEDURE — 99214 OFFICE O/P EST MOD 30 MIN: CPT

## 2020-10-01 NOTE — HISTORY OF PRESENT ILLNESS
[FreeTextEntry1] : Aubree is a 49-year-old female hypertension, TIA with left facial droop recently confirmed on MRI as [place] Palsy who has been feeling well. Just got her period and that is when the palpitations are the worst.

## 2020-10-01 NOTE — DISCUSSION/SUMMARY
[___ Month(s)] : [unfilled] month(s) [FreeTextEntry1] : The patient is a 49-year-old female hypertension, Bell's Palsy with ILR demonstrating sinus tachycardia and APC/VPC responding to low dose toprol.\par #1 Htn- stable only on enalapril\par #2 Neuro- on aspirin but final diagnosis [place] Palsy\par - no PFO\par - sinus rhythm with normal LA size, ILR with sinus tachy and APC/VPC\par - miscarriages- hematology evaluation negative\par - carotid- doppler negative\par #3 Lipids-  currently, HDL a little low, encourage daily walking to increase HDL\par #4 EP- continue toprol 12.5mg daily\par #5 General- We discussed adherence to a Mediterranean diet, weight loss and at least 30 minutes of daily exercise.\par

## 2020-10-28 ENCOUNTER — APPOINTMENT (OUTPATIENT)
Dept: NEUROLOGY | Facility: CLINIC | Age: 50
End: 2020-10-28
Payer: COMMERCIAL

## 2020-10-28 VITALS
SYSTOLIC BLOOD PRESSURE: 144 MMHG | WEIGHT: 162 LBS | BODY MASS INDEX: 32.66 KG/M2 | DIASTOLIC BLOOD PRESSURE: 85 MMHG | HEIGHT: 59 IN | HEART RATE: 77 BPM

## 2020-10-28 PROCEDURE — 99215 OFFICE O/P EST HI 40 MIN: CPT

## 2020-10-28 PROCEDURE — 99072 ADDL SUPL MATRL&STAF TM PHE: CPT

## 2020-10-28 RX ORDER — LORAZEPAM 0.5 MG/1
0.5 TABLET ORAL
Qty: 3 | Refills: 0 | Status: ACTIVE | COMMUNITY
Start: 2020-10-28 | End: 1900-01-01

## 2020-10-28 RX ORDER — OMEPRAZOLE 20 MG/1
20 CAPSULE, DELAYED RELEASE ORAL AT BEDTIME
Qty: 30 | Refills: 0 | Status: DISCONTINUED | COMMUNITY
Start: 2019-11-05 | End: 2020-10-28

## 2020-10-28 RX ORDER — CYCLOBENZAPRINE HYDROCHLORIDE 5 MG/1
5 TABLET, FILM COATED ORAL
Qty: 12 | Refills: 0 | Status: DISCONTINUED | COMMUNITY
Start: 2019-10-11 | End: 2020-10-28

## 2020-10-28 RX ORDER — OMEPRAZOLE 40 MG/1
40 CAPSULE, DELAYED RELEASE ORAL
Qty: 30 | Refills: 0 | Status: DISCONTINUED | COMMUNITY
Start: 2019-08-13 | End: 2020-10-28

## 2020-10-28 RX ORDER — MECLIZINE HYDROCHLORIDE 25 MG/1
25 TABLET ORAL
Qty: 15 | Refills: 0 | Status: DISCONTINUED | COMMUNITY
Start: 2019-10-11 | End: 2020-10-28

## 2020-10-28 NOTE — PHYSICAL EXAM
[General Appearance - Alert] : alert [General Appearance - In No Acute Distress] : in no acute distress [Oriented To Time, Place, And Person] : oriented to person, place, and time [Impaired Insight] : insight and judgment were intact [Affect] : the affect was normal [Person] : oriented to person [Place] : oriented to place [Time] : oriented to time [Concentration Intact] : normal concentrating ability [Visual Intact] : visual attention was ~T not ~L decreased [Naming Objects] : no difficulty naming common objects [Repeating Phrases] : no difficulty repeating a phrase [Writing A Sentence] : no difficulty writing a sentence [Fluency] : fluency intact [Comprehension] : comprehension intact [Reading] : reading intact [Past History] : adequate knowledge of personal past history [Cranial Nerves Optic (II)] : visual acuity intact bilaterally,  visual fields full to confrontation, pupils equal round and reactive to light [Cranial Nerves Oculomotor (III)] : extraocular motion intact [Cranial Nerves Trigeminal (V)] : facial sensation intact symmetrically [Cranial Nerves Facial (VII)] : face symmetrical [Cranial Nerves Vestibulocochlear (VIII)] : hearing was intact bilaterally [Cranial Nerves Glossopharyngeal (IX)] : tongue and palate midline [Cranial Nerves Accessory (XI - Cranial And Spinal)] : head turning and shoulder shrug symmetric [Cranial Nerves Hypoglossal (XII)] : there was no tongue deviation with protrusion [Motor Tone] : muscle tone was normal in all four extremities [Motor Strength] : muscle strength was normal in all four extremities [No Muscle Atrophy] : normal bulk in all four extremities [Sensation Tactile Decrease] : light touch was intact [Abnormal Walk] : normal gait [Balance] : balance was intact [Sclera] : the sclera and conjunctiva were normal [PERRL With Normal Accommodation] : pupils were equal in size, round, reactive to light, with normal accommodation [Paresis Pronator Drift Right-Sided] : no pronator drift on the right [Paresis Pronator Drift Left-Sided] : no pronator drift on the left [Motor Strength Upper Extremities Bilaterally] : strength was normal in both upper extremities [Motor Strength Lower Extremities Bilaterally] : strength was normal in both lower extremities [Romberg's Sign] : Romberg's sign was negtive [Past-pointing] : there was no past-pointing [Tremor] : no tremor present [Dysdiadochokinesia Bilaterally] : not present [Coordination - Dysmetria Impaired Finger-to-Nose Bilateral] : not present [Coordination - Dysmetria Impaired Heel-to-Shin Bilateral] : not present [Plantar Reflex Right Only] : normal on the right [Plantar Reflex Left Only] : normal on the left [FreeTextEntry6] : 5/5 in all extremities  [FreeTextEntry1] : NO ptosis noted

## 2020-10-28 NOTE — HISTORY OF PRESENT ILLNESS
[FreeTextEntry1] : Ms. Romero is a 49 year old female PMHx HTN who presents today for follow up after a TIA on 8/6/2019\par \par Yesterday (10/27) around 11 am she developed acute onset of left ptosis that lasted 24 hours and was not associated with any neurological signs or symptoms. She showed me pictures of the event and ptosis is subtle. She states she had a mild headache associated with it. 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 \par \par HPI (from hospital admission) \par Patient is a 48Y RH AA Female with PMH HTN who presented with L sided facial droop. LKN was 9:30am this morning. Stated that she went to put her daughter on the bus and started feeling very nauseous and began dry heaving. Went to work and had her BP checked which she stated was 120s systolic. Her work supervisor noticed her L sided facial droop and suggested she get it checked out. Patient herself did not notice any droop or facial numbness initially. She was initially evaluated by ED and was thought to have Bell's palsy. Upon re-evaluation, was noticed to be speaking differently and less dysarthric than prior. Patient also stated she had gait instability which was later explained as "floating on air" but did not have any actual weakness or numbness in her extremities. Code stroke called for new information- dysarthria and gait instability. CT Head was unremarkable for acute intracranial pathology. NIHSS: 0 (mild sensory loss of L V3 distribution)), MRS: 0 \par \par Workup:\par CT head 8.6.19\par Unremarkable noncontrast head CT.\par \par MRI/MRA Head and neck 8.7.19\par Ventricles and sulci unremarkable in size. No restricted diffusion, hemorrhage, or midline shift. There is no abnormal enhancement in the brain parenchyma or meninges. Intracranial and extracranial MR angiography is unremarkable by NASCET criteria. Enlarged heterogeneous thyroid gland, suggest ultrasound for improved assessment.\par \par TTE NO PFO or source of embolism \par

## 2020-10-28 NOTE — REVIEW OF SYSTEMS
[As Noted in HPI] : as noted in HPI [Negative] : Heme/Lymph [Depression] : no depression [de-identified] : denies suicidal or homicial ideation

## 2020-10-28 NOTE — DISCUSSION/SUMMARY
[Antithrombotic therapy with ___] : antithrombotic therapy with  [unfilled] [Intensive Blood Pressure Control] : intensive blood pressure control [Patient encouraged to discuss with Primary MD] : I encouraged the patient to discuss these important issues with ~his/her~ primary care doctor [Goals and Counseling] : I have reviewed the goals of stroke risk factor modification. I counseled the patient on measures to reduce stroke risk, including the importance of medication compliance, risk factor control, exercise, healthy diet and avoidance of smoking. I reviewed stroke warning signs and symptoms and appropriate actions to take if such occur. [FreeTextEntry1] : Impression:\par 1. right hemispheric TIA secondary to unknown etiology vs complex migraine \par 2. 24 hour onset of subtle left eye ptosis in setting of headache unlikely vascular in nature . \par \par \par Neurologically she is stable except for resolved subtle left ptosis. Continue ASA 81 mg once daily for secondary stroke prevention. Continue to follow up with PCP/cardiology for BP and statin management (goal LDL <70) as well as all routine primary care needs. Follow up with cardiology for evaluation and management of ILR to screen for source of embolism. We discussed aggressive vascular strict risk factor control. MR ordered to assess for acute pathology although unlikely. Patient with claustrophobia so Ativan 0.5 mg prescribed x 3 pills. Instructed on how to take also noted it on the prescription. Advised patient to avoid driving for 24 hours. Side effects discussed with patient.Encouraged her to use her glasses and get a antiglare screen for her computer to reduce eye strain \par \par Follow up in 3-6 months with stroke MD. TCD and Doppler's to be obtained at next appointment. Patient and family were educated on signs and symptoms of stroke and to contact 911 immediately if experiencing any.

## 2020-11-01 ENCOUNTER — OUTPATIENT (OUTPATIENT)
Dept: OUTPATIENT SERVICES | Facility: HOSPITAL | Age: 50
LOS: 1 days | End: 2020-11-01
Payer: COMMERCIAL

## 2020-11-01 ENCOUNTER — APPOINTMENT (OUTPATIENT)
Dept: MRI IMAGING | Facility: CLINIC | Age: 50
End: 2020-11-01
Payer: COMMERCIAL

## 2020-11-01 DIAGNOSIS — Z98.89 OTHER SPECIFIED POSTPROCEDURAL STATES: Chronic | ICD-10-CM

## 2020-11-01 DIAGNOSIS — G45.9 TRANSIENT CEREBRAL ISCHEMIC ATTACK, UNSPECIFIED: ICD-10-CM

## 2020-11-01 PROCEDURE — 70551 MRI BRAIN STEM W/O DYE: CPT

## 2020-11-01 PROCEDURE — 70551 MRI BRAIN STEM W/O DYE: CPT | Mod: 26

## 2020-11-02 ENCOUNTER — NON-APPOINTMENT (OUTPATIENT)
Age: 50
End: 2020-11-02

## 2020-11-03 ENCOUNTER — APPOINTMENT (OUTPATIENT)
Dept: ELECTROPHYSIOLOGY | Facility: CLINIC | Age: 50
End: 2020-11-03
Payer: COMMERCIAL

## 2020-11-03 ENCOUNTER — NON-APPOINTMENT (OUTPATIENT)
Age: 50
End: 2020-11-03

## 2020-11-03 PROCEDURE — G2066: CPT

## 2020-11-03 PROCEDURE — 93298 REM INTERROG DEV EVAL SCRMS: CPT

## 2020-12-08 ENCOUNTER — APPOINTMENT (OUTPATIENT)
Dept: ELECTROPHYSIOLOGY | Facility: CLINIC | Age: 50
End: 2020-12-08

## 2021-01-19 ENCOUNTER — OUTPATIENT (OUTPATIENT)
Dept: OUTPATIENT SERVICES | Facility: HOSPITAL | Age: 51
LOS: 1 days | End: 2021-01-19

## 2021-01-19 ENCOUNTER — APPOINTMENT (OUTPATIENT)
Dept: NEUROLOGY | Facility: CLINIC | Age: 51
End: 2021-01-19
Payer: COMMERCIAL

## 2021-01-19 VITALS
DIASTOLIC BLOOD PRESSURE: 78 MMHG | WEIGHT: 162.04 LBS | HEART RATE: 86 BPM | OXYGEN SATURATION: 97 % | SYSTOLIC BLOOD PRESSURE: 122 MMHG | HEIGHT: 59.5 IN | RESPIRATION RATE: 16 BRPM | TEMPERATURE: 97 F

## 2021-01-19 VITALS
HEIGHT: 59 IN | WEIGHT: 162 LBS | HEART RATE: 85 BPM | SYSTOLIC BLOOD PRESSURE: 150 MMHG | BODY MASS INDEX: 32.66 KG/M2 | DIASTOLIC BLOOD PRESSURE: 84 MMHG

## 2021-01-19 VITALS — TEMPERATURE: 97.6 F

## 2021-01-19 DIAGNOSIS — G45.9 TRANSIENT CEREBRAL ISCHEMIC ATTACK, UNSPECIFIED: ICD-10-CM

## 2021-01-19 DIAGNOSIS — Z90.79 ACQUIRED ABSENCE OF OTHER GENITAL ORGAN(S): Chronic | ICD-10-CM

## 2021-01-19 DIAGNOSIS — Z01.812 ENCOUNTER FOR PREPROCEDURAL LABORATORY EXAMINATION: ICD-10-CM

## 2021-01-19 DIAGNOSIS — N92.0 EXCESSIVE AND FREQUENT MENSTRUATION WITH REGULAR CYCLE: ICD-10-CM

## 2021-01-19 DIAGNOSIS — Z98.89 OTHER SPECIFIED POSTPROCEDURAL STATES: Chronic | ICD-10-CM

## 2021-01-19 DIAGNOSIS — Z95.818 PRESENCE OF OTHER CARDIAC IMPLANTS AND GRAFTS: Chronic | ICD-10-CM

## 2021-01-19 DIAGNOSIS — I10 ESSENTIAL (PRIMARY) HYPERTENSION: ICD-10-CM

## 2021-01-19 DIAGNOSIS — I47.1 SUPRAVENTRICULAR TACHYCARDIA: ICD-10-CM

## 2021-01-19 LAB
ANION GAP SERPL CALC-SCNC: 10 MMOL/L — SIGNIFICANT CHANGE UP (ref 7–14)
BUN SERPL-MCNC: 11 MG/DL — SIGNIFICANT CHANGE UP (ref 7–23)
CALCIUM SERPL-MCNC: 9.4 MG/DL — SIGNIFICANT CHANGE UP (ref 8.4–10.5)
CHLORIDE SERPL-SCNC: 102 MMOL/L — SIGNIFICANT CHANGE UP (ref 98–107)
CO2 SERPL-SCNC: 27 MMOL/L — SIGNIFICANT CHANGE UP (ref 22–31)
CREAT SERPL-MCNC: 0.62 MG/DL — SIGNIFICANT CHANGE UP (ref 0.5–1.3)
GLUCOSE SERPL-MCNC: 77 MG/DL — SIGNIFICANT CHANGE UP (ref 70–99)
HCT VFR BLD CALC: 40.3 % — SIGNIFICANT CHANGE UP (ref 34.5–45)
HGB BLD-MCNC: 12.5 G/DL — SIGNIFICANT CHANGE UP (ref 11.5–15.5)
MCHC RBC-ENTMCNC: 27.7 PG — SIGNIFICANT CHANGE UP (ref 27–34)
MCHC RBC-ENTMCNC: 31 GM/DL — LOW (ref 32–36)
MCV RBC AUTO: 89.2 FL — SIGNIFICANT CHANGE UP (ref 80–100)
NRBC # BLD: 0 /100 WBCS — SIGNIFICANT CHANGE UP
NRBC # FLD: 0 K/UL — SIGNIFICANT CHANGE UP
PLATELET # BLD AUTO: 226 K/UL — SIGNIFICANT CHANGE UP (ref 150–400)
POTASSIUM SERPL-MCNC: 3.8 MMOL/L — SIGNIFICANT CHANGE UP (ref 3.5–5.3)
POTASSIUM SERPL-SCNC: 3.8 MMOL/L — SIGNIFICANT CHANGE UP (ref 3.5–5.3)
RBC # BLD: 4.52 M/UL — SIGNIFICANT CHANGE UP (ref 3.8–5.2)
RBC # FLD: 15 % — HIGH (ref 10.3–14.5)
SODIUM SERPL-SCNC: 139 MMOL/L — SIGNIFICANT CHANGE UP (ref 135–145)
WBC # BLD: 6.04 K/UL — SIGNIFICANT CHANGE UP (ref 3.8–10.5)
WBC # FLD AUTO: 6.04 K/UL — SIGNIFICANT CHANGE UP (ref 3.8–10.5)

## 2021-01-19 PROCEDURE — 99213 OFFICE O/P EST LOW 20 MIN: CPT

## 2021-01-19 PROCEDURE — 99072 ADDL SUPL MATRL&STAF TM PHE: CPT

## 2021-01-19 RX ORDER — ASPIRIN/CALCIUM CARB/MAGNESIUM 324 MG
1 TABLET ORAL
Qty: 0 | Refills: 0 | DISCHARGE

## 2021-01-19 RX ORDER — SODIUM CHLORIDE 9 MG/ML
1000 INJECTION, SOLUTION INTRAVENOUS
Refills: 0 | Status: DISCONTINUED | OUTPATIENT
Start: 2021-02-01 | End: 2021-02-15

## 2021-01-19 NOTE — H&P PST ADULT - NS MD HP INPLANTS MED DEV
loop recorder, right breast marker loop recorder [employer] LINQ serial #[id number], right breast marker

## 2021-01-19 NOTE — H&P PST ADULT - MALLAMPATI CLASS
denies loose teeth or dentures denies loose teeth or dentures/Class III - visualization of the soft palate and the base of the uvula

## 2021-01-19 NOTE — H&P PST ADULT - NEGATIVE ENMT SYMPTOMS
no hearing difficulty/no ear pain/no tinnitus/no sinus symptoms/no nasal congestion/no nose bleeds/no gum bleeding/no throat pain/no dysphagia

## 2021-01-19 NOTE — H&P PST ADULT - OTHER CARE PROVIDERS
Dr. Nielsen Cardiologist, Dr. Bobby Neurologist [phone] Dr. Nielsen Cardiologist [phone], Dr. Bobby Neurologist [phone]

## 2021-01-19 NOTE — H&P PST ADULT - NEGATIVE NEUROLOGICAL SYMPTOMS
no paresthesias/no focal seizures/no syncope/no tremors/no vertigo/no loss of sensation/no difficulty walking/no headache

## 2021-01-19 NOTE — H&P PST ADULT - NSICDXPASTMEDICALHX_GEN_ALL_CORE_FT
PAST MEDICAL HISTORY:  Anxiety     Excessive and frequent menstruation with regular cycle     HLD (hyperlipidemia)     Hypertension     TIA (transient ischemic attack)      PAST MEDICAL HISTORY:  Anxiety     Excessive and frequent menstruation with regular cycle     HLD (hyperlipidemia)     Hypertension     Obesity     TIA (transient ischemic attack) 2018     PAST MEDICAL HISTORY:  Anxiety     Excessive and frequent menstruation with regular cycle     HLD (hyperlipidemia)     Hypertension     Obesity     Paroxysmal SVT (supraventricular tachycardia)     TIA (transient ischemic attack) 2018

## 2021-01-19 NOTE — H&P PST ADULT - HISTORY OF PRESENT ILLNESS
51 yo female presents to [facility] for preop evaluation dilation curettage hysteroscopy, hydro thermal ablation.  Patient reports menorrhagia for the past 2 years. Patient denies any abdominal pain or vaginal discharge.  Patient diagnosed with excessive and frequent menstruation with regular cycle.

## 2021-01-19 NOTE — H&P PST ADULT - NSICDXPROBLEM_GEN_ALL_CORE_FT
PROBLEM DIAGNOSES  Problem: Excessive and frequent menstruation with regular cycle  Assessment and Plan: Patient scheduled for dilation curettage hysteroscopy, hydro thermal ablation on 1/25/2021  Written & verbal preop instructions, gi prophylaxis given  Pt verbalized good understanding.    Problem: TIA (transient ischemic attack)  Assessment and Plan: Patient with h/o TIA in 2018 on Aspirin, patient had Neurology evaluation today, copy of report in chart.  Per Neurologist patient to continue Aspirin.    Problem: Hypertension  Assessment and Plan: Patient instructed to take Enalapril on AM of surgery with small sip of water   Patient ha recent Cardiology evaluation, copy of report in chart, copy of most recent Echo report in chart    Problem: Encounter for preprocedure screening laboratory testing for COVID-19  Assessment and Plan: Patient aware of need for COVID testing prior to procedure and advised to co ordinate with surgeon.        PROBLEM DIAGNOSES  Problem: Paroxysmal SVT (supraventricular tachycardia)  Assessment and Plan: Patient with internal loop recorder, OR booking notified  Patient instructed to take Metoprolol as per routine schedule  Patient had recent Cardiology evaluation, copy of report in chart, copy of most recent Echo report in chart    Problem: Excessive and frequent menstruation with regular cycle  Assessment and Plan: Patient scheduled for dilation curettage hysteroscopy, hydro thermal ablation on 1/25/2021  Written & verbal preop instructions, gi prophylaxis given  Pt verbalized good understanding.    Problem: TIA (transient ischemic attack)  Assessment and Plan: Patient with h/o TIA in 2018 on Aspirin, patient had Neurology evaluation today, copy of report in chart.  Per Neurologist patient to continue Aspirin.    Problem: Hypertension  Assessment and Plan: Patient instructed to take Enalapril on AM of surgery with small sip of water    Problem: Encounter for preprocedure screening laboratory testing for COVID-19  Assessment and Plan: Patient aware of need for COVID testing prior to procedure and advised to co ordinate with surgeon.

## 2021-01-19 NOTE — H&P PST ADULT - NSICDXPASTSURGICALHX_GEN_ALL_CORE_FT
PAST SURGICAL HISTORY:  S/P breast lumpectomy right x3    S/P  section x3     PAST SURGICAL HISTORY:  H/O bilateral salpingectomy     S/P breast lumpectomy right x3    S/P  section x3    Status post placement of implantable loop recorder 2018

## 2021-01-20 ENCOUNTER — NON-APPOINTMENT (OUTPATIENT)
Age: 51
End: 2021-01-20

## 2021-01-22 ENCOUNTER — APPOINTMENT (OUTPATIENT)
Dept: DISASTER EMERGENCY | Facility: CLINIC | Age: 51
End: 2021-01-22

## 2021-01-22 ENCOUNTER — LABORATORY RESULT (OUTPATIENT)
Age: 51
End: 2021-01-22

## 2021-01-22 PROBLEM — N92.0 EXCESSIVE AND FREQUENT MENSTRUATION WITH REGULAR CYCLE: Chronic | Status: ACTIVE | Noted: 2021-01-19

## 2021-01-22 PROBLEM — I47.1 SUPRAVENTRICULAR TACHYCARDIA: Chronic | Status: ACTIVE | Noted: 2021-01-19

## 2021-01-22 PROBLEM — G45.9 TRANSIENT CEREBRAL ISCHEMIC ATTACK, UNSPECIFIED: Chronic | Status: ACTIVE | Noted: 2019-11-12

## 2021-01-22 PROBLEM — E66.9 OBESITY, UNSPECIFIED: Chronic | Status: ACTIVE | Noted: 2021-01-19

## 2021-01-22 PROBLEM — F41.9 ANXIETY DISORDER, UNSPECIFIED: Chronic | Status: ACTIVE | Noted: 2021-01-19

## 2021-01-22 NOTE — ASU PATIENT PROFILE, ADULT - PSH
H/O bilateral salpingectomy    S/P breast lumpectomy  right x3  S/P  section  x3  Status post placement of implantable loop recorder  2018

## 2021-01-22 NOTE — ASU PATIENT PROFILE, ADULT - PMH
Anxiety    Excessive and frequent menstruation with regular cycle    HLD (hyperlipidemia)    Hypertension    Obesity    Paroxysmal SVT (supraventricular tachycardia)    TIA (transient ischemic attack)  2018

## 2021-01-26 ENCOUNTER — NON-APPOINTMENT (OUTPATIENT)
Age: 51
End: 2021-01-26

## 2021-01-28 DIAGNOSIS — Z01.818 ENCOUNTER FOR OTHER PREPROCEDURAL EXAMINATION: ICD-10-CM

## 2021-01-29 ENCOUNTER — APPOINTMENT (OUTPATIENT)
Dept: DISASTER EMERGENCY | Facility: CLINIC | Age: 51
End: 2021-01-29

## 2021-01-30 LAB — SARS-COV-2 N GENE NPH QL NAA+PROBE: NOT DETECTED

## 2021-01-31 ENCOUNTER — TRANSCRIPTION ENCOUNTER (OUTPATIENT)
Age: 51
End: 2021-01-31

## 2021-02-01 ENCOUNTER — RESULT REVIEW (OUTPATIENT)
Age: 51
End: 2021-02-01

## 2021-02-01 ENCOUNTER — OUTPATIENT (OUTPATIENT)
Dept: OUTPATIENT SERVICES | Facility: HOSPITAL | Age: 51
LOS: 1 days | Discharge: HOME | End: 2021-02-01
Payer: COMMERCIAL

## 2021-02-01 VITALS
OXYGEN SATURATION: 98 % | DIASTOLIC BLOOD PRESSURE: 68 MMHG | RESPIRATION RATE: 15 BRPM | SYSTOLIC BLOOD PRESSURE: 116 MMHG | HEART RATE: 63 BPM

## 2021-02-01 VITALS
RESPIRATION RATE: 18 BRPM | HEART RATE: 83 BPM | SYSTOLIC BLOOD PRESSURE: 131 MMHG | HEIGHT: 59.5 IN | WEIGHT: 162.04 LBS | OXYGEN SATURATION: 100 % | DIASTOLIC BLOOD PRESSURE: 77 MMHG | TEMPERATURE: 98 F

## 2021-02-01 DIAGNOSIS — Z98.89 OTHER SPECIFIED POSTPROCEDURAL STATES: Chronic | ICD-10-CM

## 2021-02-01 DIAGNOSIS — Z95.818 PRESENCE OF OTHER CARDIAC IMPLANTS AND GRAFTS: Chronic | ICD-10-CM

## 2021-02-01 DIAGNOSIS — N92.0 EXCESSIVE AND FREQUENT MENSTRUATION WITH REGULAR CYCLE: ICD-10-CM

## 2021-02-01 DIAGNOSIS — Z90.79 ACQUIRED ABSENCE OF OTHER GENITAL ORGAN(S): Chronic | ICD-10-CM

## 2021-02-01 PROCEDURE — 88305 TISSUE EXAM BY PATHOLOGIST: CPT | Mod: 26

## 2021-02-01 RX ORDER — METOPROLOL TARTRATE 50 MG
0.5 TABLET ORAL
Qty: 0 | Refills: 0 | DISCHARGE

## 2021-02-01 RX ORDER — ASPIRIN/CALCIUM CARB/MAGNESIUM 324 MG
1 TABLET ORAL
Qty: 0 | Refills: 0 | DISCHARGE

## 2021-02-01 RX ORDER — IBUPROFEN 200 MG
600 TABLET ORAL ONCE
Refills: 0 | Status: COMPLETED | OUTPATIENT
Start: 2021-02-01 | End: 2021-02-01

## 2021-02-01 RX ORDER — SODIUM CHLORIDE 9 MG/ML
1000 INJECTION, SOLUTION INTRAVENOUS
Refills: 0 | Status: DISCONTINUED | OUTPATIENT
Start: 2021-02-01 | End: 2021-02-15

## 2021-02-01 RX ADMIN — Medication 600 MILLIGRAM(S): at 14:45

## 2021-02-01 NOTE — BRIEF OPERATIVE NOTE - NSICDXBRIEFPROCEDURE_GEN_ALL_CORE_FT
PROCEDURES:  Hysteroscopy with hydrothermal ablation of endometrium with dilation and curettage if indicated 01-Feb-2021 13:46:20  Nazanin Sánchez

## 2021-02-01 NOTE — ASU DISCHARGE PLAN (ADULT/PEDIATRIC) - CARE PROVIDER_API CALL
Aubree Medrano)  [place] [address]  Phone: [phone]  Fax: [phone]  Follow Up Time: 2 weeks

## 2021-02-01 NOTE — ASU DISCHARGE PLAN (ADULT/PEDIATRIC) - PAIN MANAGEMENT
Take over the counter pain medication motrin and tylenol as needed/Take over the counter pain medication

## 2021-02-01 NOTE — BRIEF OPERATIVE NOTE - OPERATION/FINDINGS
6 week size anteverted uterus, w/ subserosal fibroid  normal uterine cavity, ostia visualized bilaterally  hydrothermal ablation performed  fluid deficit 0

## 2021-02-01 NOTE — ASU DISCHARGE PLAN (ADULT/PEDIATRIC) - MEDICATION INSTRUCTIONS
motrin and tylenol as needed You received IV Tylenol for pain management at 1 PM. Please DO NOT take any Tylenol (Acetaminophen) containing products, such as Vicodin, Percocet, Excedrin, and cold medications for the next 6 hours (until 7 PM). DO NOT TAKE MORE THAN 3000 MG OF TYLENOL in a 24 hour period.

## 2021-02-01 NOTE — ASU DISCHARGE PLAN (ADULT/PEDIATRIC) - CALL YOUR DOCTOR IF YOU HAVE ANY OF THE FOLLOWING:
Bleeding that does not stop/Swelling that gets worse/Pain not relieved by Medications/Fever greater than (need to indicate Fahrenheit or Celsius)/Nausea and vomiting that does not stop/Inability to tolerate liquids or foods Bleeding that does not stop/Swelling that gets worse/Pain not relieved by Medications/Fever greater than (need to indicate Fahrenheit or Celsius)/Wound/Surgical Site with redness, or foul smelling discharge or pus/Nausea and vomiting that does not stop/Unable to urinate/Inability to tolerate liquids or foods

## 2021-02-01 NOTE — ASU DISCHARGE PLAN (ADULT/PEDIATRIC) - FOLLOW UP APPOINTMENTS
may also call Recovery Room (PACU) 24/7 @ [phone]/[facility], [facility]

## 2021-02-04 LAB — SURGICAL PATHOLOGY STUDY: SIGNIFICANT CHANGE UP

## 2021-02-11 ENCOUNTER — APPOINTMENT (OUTPATIENT)
Dept: CARDIOLOGY | Facility: CLINIC | Age: 51
End: 2021-02-11

## 2021-04-28 ENCOUNTER — APPOINTMENT (OUTPATIENT)
Dept: NEUROLOGY | Facility: CLINIC | Age: 51
End: 2021-04-28
Payer: COMMERCIAL

## 2021-04-28 PROCEDURE — 93886 INTRACRANIAL COMPLETE STUDY: CPT

## 2021-04-28 PROCEDURE — 99072 ADDL SUPL MATRL&STAF TM PHE: CPT

## 2021-04-28 PROCEDURE — 93892 TCD EMBOLI DETECT W/O INJ: CPT

## 2021-04-28 PROCEDURE — 93880 EXTRACRANIAL BILAT STUDY: CPT

## 2021-04-28 PROCEDURE — 99213 OFFICE O/P EST LOW 20 MIN: CPT

## 2021-05-06 ENCOUNTER — APPOINTMENT (OUTPATIENT)
Dept: CARDIOLOGY | Facility: CLINIC | Age: 51
End: 2021-05-06
Payer: COMMERCIAL

## 2021-05-06 ENCOUNTER — NON-APPOINTMENT (OUTPATIENT)
Age: 51
End: 2021-05-06

## 2021-05-06 VITALS
HEART RATE: 76 BPM | TEMPERATURE: 97.1 F | HEIGHT: 59 IN | SYSTOLIC BLOOD PRESSURE: 128 MMHG | DIASTOLIC BLOOD PRESSURE: 78 MMHG | OXYGEN SATURATION: 94 % | WEIGHT: 167 LBS | BODY MASS INDEX: 33.67 KG/M2

## 2021-05-06 DIAGNOSIS — E78.5 HYPERLIPIDEMIA, UNSPECIFIED: ICD-10-CM

## 2021-05-06 DIAGNOSIS — K59.09 OTHER CONSTIPATION: ICD-10-CM

## 2021-05-06 PROCEDURE — 99214 OFFICE O/P EST MOD 30 MIN: CPT

## 2021-05-06 PROCEDURE — 93000 ELECTROCARDIOGRAM COMPLETE: CPT

## 2021-05-06 PROCEDURE — 99072 ADDL SUPL MATRL&STAF TM PHE: CPT

## 2021-05-06 RX ORDER — ATORVASTATIN CALCIUM 20 MG/1
20 TABLET, FILM COATED ORAL
Qty: 1 | Refills: 1 | Status: ACTIVE | COMMUNITY
Start: 2021-05-06

## 2021-05-06 RX ORDER — ENALAPRIL MALEATE 10 MG/1
10 TABLET ORAL DAILY
Qty: 30 | Refills: 3 | Status: DISCONTINUED | COMMUNITY
Start: 2017-03-06 | End: 2021-05-06

## 2021-05-06 NOTE — HISTORY OF PRESENT ILLNESS
[FreeTextEntry1] : Aubree has problem with constipation and was started on lactulose. Statin added. Got vaccinated. \par No further palpitations. No chest pain, shortness of breath or dizziness. Wants to start exercise and lose weight.

## 2021-05-06 NOTE — DISCUSSION/SUMMARY
[FreeTextEntry1] : The patient is a 50-year-old female hypertension, Bell's Palsy with ILR demonstrating sinus tachycardia and APC/VPC responding to low dose toprol now with chronic constipation.\par #1 Htn- continue enalapril 10/25mg\par #2 Neuro- on aspirin but final diagnosis [place] Palsy\par - no PFO\par - sinus rhythm with normal LA size, ILR with sinus tachy and APC/VPC\par - miscarriages- hematology evaluation negative\par - carotid- doppler negative\par #3 Lipids- continue atorvastatin\par #4 EP- continue toprol 12.5mg daily\par #5 General- We discussed adherence to a Mediterranean diet, weight loss and at least 30 minutes of daily exercise. Recieved COVID vaccines.\par

## 2021-05-06 NOTE — REVIEW OF SYSTEMS
[Weight Gain (___ Lbs)] : [unfilled] ~Ulb weight gain [SOB] : no shortness of breath [Dyspnea on exertion] : not dyspnea during exertion [Chest Discomfort] : no chest discomfort [Lower Ext Edema] : no extremity edema [Leg Claudication] : no intermittent leg claudication [Palpitations] : no palpitations [Orthopnea] : no orthopnea [PND] : no PND [Syncope] : no syncope [Negative] : Heme/Lymph

## 2021-06-03 ENCOUNTER — APPOINTMENT (OUTPATIENT)
Dept: ELECTROPHYSIOLOGY | Facility: CLINIC | Age: 51
End: 2021-06-03
Payer: COMMERCIAL

## 2021-06-03 ENCOUNTER — NON-APPOINTMENT (OUTPATIENT)
Age: 51
End: 2021-06-03

## 2021-06-03 PROCEDURE — G2066: CPT

## 2021-06-03 PROCEDURE — 93298 REM INTERROG DEV EVAL SCRMS: CPT

## 2021-07-06 ENCOUNTER — APPOINTMENT (OUTPATIENT)
Dept: ELECTROPHYSIOLOGY | Facility: CLINIC | Age: 51
End: 2021-07-06
Payer: COMMERCIAL

## 2021-07-06 ENCOUNTER — NON-APPOINTMENT (OUTPATIENT)
Age: 51
End: 2021-07-06

## 2021-07-06 PROCEDURE — G2066: CPT

## 2021-07-06 PROCEDURE — 93298 REM INTERROG DEV EVAL SCRMS: CPT

## 2021-08-10 ENCOUNTER — NON-APPOINTMENT (OUTPATIENT)
Age: 51
End: 2021-08-10

## 2021-08-10 ENCOUNTER — APPOINTMENT (OUTPATIENT)
Dept: ELECTROPHYSIOLOGY | Facility: CLINIC | Age: 51
End: 2021-08-10
Payer: COMMERCIAL

## 2021-08-10 PROCEDURE — 93298 REM INTERROG DEV EVAL SCRMS: CPT

## 2021-08-10 PROCEDURE — G2066: CPT

## 2021-09-03 ENCOUNTER — EMERGENCY (EMERGENCY)
Facility: HOSPITAL | Age: 51
LOS: 1 days | Discharge: HOME | End: 2021-09-03
Attending: EMERGENCY MEDICINE | Admitting: EMERGENCY MEDICINE
Payer: COMMERCIAL

## 2021-09-03 VITALS
OXYGEN SATURATION: 100 % | SYSTOLIC BLOOD PRESSURE: 111 MMHG | RESPIRATION RATE: 16 BRPM | DIASTOLIC BLOOD PRESSURE: 74 MMHG | TEMPERATURE: 98 F | HEART RATE: 78 BPM

## 2021-09-03 VITALS
RESPIRATION RATE: 18 BRPM | HEART RATE: 73 BPM | DIASTOLIC BLOOD PRESSURE: 85 MMHG | SYSTOLIC BLOOD PRESSURE: 134 MMHG | TEMPERATURE: 98 F | OXYGEN SATURATION: 100 % | HEIGHT: 59.5 IN

## 2021-09-03 DIAGNOSIS — Z98.89 OTHER SPECIFIED POSTPROCEDURAL STATES: Chronic | ICD-10-CM

## 2021-09-03 DIAGNOSIS — Z95.818 PRESENCE OF OTHER CARDIAC IMPLANTS AND GRAFTS: Chronic | ICD-10-CM

## 2021-09-03 DIAGNOSIS — Z90.79 ACQUIRED ABSENCE OF OTHER GENITAL ORGAN(S): Chronic | ICD-10-CM

## 2021-09-03 LAB
ANION GAP SERPL CALC-SCNC: 12 MMOL/L — SIGNIFICANT CHANGE UP (ref 7–14)
BUN SERPL-MCNC: 15 MG/DL — SIGNIFICANT CHANGE UP (ref 7–23)
CALCIUM SERPL-MCNC: 9.2 MG/DL — SIGNIFICANT CHANGE UP (ref 8.4–10.5)
CHLORIDE SERPL-SCNC: 96 MMOL/L — LOW (ref 98–107)
CO2 SERPL-SCNC: 30 MMOL/L — SIGNIFICANT CHANGE UP (ref 22–31)
CREAT SERPL-MCNC: 0.69 MG/DL — SIGNIFICANT CHANGE UP (ref 0.5–1.3)
GLUCOSE SERPL-MCNC: 106 MG/DL — HIGH (ref 70–99)
HCT VFR BLD CALC: 40.2 % — SIGNIFICANT CHANGE UP (ref 34.5–45)
HGB BLD-MCNC: 14 G/DL — SIGNIFICANT CHANGE UP (ref 11.5–15.5)
MAGNESIUM SERPL-MCNC: 2.1 MG/DL — SIGNIFICANT CHANGE UP (ref 1.6–2.6)
MCHC RBC-ENTMCNC: 30.1 PG — SIGNIFICANT CHANGE UP (ref 27–34)
MCHC RBC-ENTMCNC: 34.8 GM/DL — SIGNIFICANT CHANGE UP (ref 32–36)
MCV RBC AUTO: 86.5 FL — SIGNIFICANT CHANGE UP (ref 80–100)
NRBC # BLD: 0 /100 WBCS — SIGNIFICANT CHANGE UP
NRBC # FLD: 0 K/UL — SIGNIFICANT CHANGE UP
PHOSPHATE SERPL-MCNC: 3.8 MG/DL — SIGNIFICANT CHANGE UP (ref 2.5–4.5)
PLATELET # BLD AUTO: 197 K/UL — SIGNIFICANT CHANGE UP (ref 150–400)
POTASSIUM SERPL-MCNC: 3.3 MMOL/L — LOW (ref 3.5–5.3)
POTASSIUM SERPL-SCNC: 3.3 MMOL/L — LOW (ref 3.5–5.3)
RBC # BLD: 4.65 M/UL — SIGNIFICANT CHANGE UP (ref 3.8–5.2)
RBC # FLD: 13.3 % — SIGNIFICANT CHANGE UP (ref 10.3–14.5)
SARS-COV-2 RNA SPEC QL NAA+PROBE: SIGNIFICANT CHANGE UP
SODIUM SERPL-SCNC: 138 MMOL/L — SIGNIFICANT CHANGE UP (ref 135–145)
TSH SERPL-MCNC: 3.24 UIU/ML — SIGNIFICANT CHANGE UP (ref 0.27–4.2)
WBC # BLD: 6.33 K/UL — SIGNIFICANT CHANGE UP (ref 3.8–10.5)
WBC # FLD AUTO: 6.33 K/UL — SIGNIFICANT CHANGE UP (ref 3.8–10.5)

## 2021-09-03 PROCEDURE — 99236 HOSP IP/OBS SAME DATE HI 85: CPT | Mod: 25

## 2021-09-03 PROCEDURE — 93010 ELECTROCARDIOGRAM REPORT: CPT

## 2021-09-03 PROCEDURE — 93291 INTERROG DEV EVAL SCRMS IP: CPT | Mod: 26

## 2021-09-03 RX ORDER — POTASSIUM CHLORIDE 20 MEQ
40 PACKET (EA) ORAL ONCE
Refills: 0 | Status: COMPLETED | OUTPATIENT
Start: 2021-09-03 | End: 2021-09-03

## 2021-09-03 RX ADMIN — Medication 40 MILLIEQUIVALENT(S): at 06:02

## 2021-09-03 NOTE — ED PROVIDER NOTE - NSICDXPASTMEDICALHX_GEN_ALL_CORE_FT
PAST MEDICAL HISTORY:  Anxiety     Excessive and frequent menstruation with regular cycle     HLD (hyperlipidemia)     Hypertension     Obesity     Paroxysmal SVT (supraventricular tachycardia)     TIA (transient ischemic attack) 2018

## 2021-09-03 NOTE — ED CDU PROVIDER INITIAL DAY NOTE - PROGRESS NOTE DETAILS
Luisa Cassidy PGY-2 spoke with cardiology fellow, will attempt to interrogate recorder. if unable to, advising to wait til 8am for EP interrogation. gwen cummings pgy3: pt resting comfortably in bed, HR in the 70s, vital signs stable. spoke with EP will come and attempt to interrogate loop recorder. Pt has no complaints.  Palpitations resolved.  No cp or sob.  Pending ep eval for loop recorder interrogation. gwen cummings pgy3: pt seen by EP and had loop recorder evaluated with no events noted. pt with no complaints at this time, no recurrent palpitations. will discharge home and pt will follow up with Dr. Nielsen.

## 2021-09-03 NOTE — ED CDU PROVIDER DISPOSITION NOTE - ATTENDING CONTRIBUTION TO CARE
DR. MONTERO, ATTENDING MD-  I performed a face to face bedside interview with the patient regarding history of present illness, review of symptoms and past medical history. I completed an independent physical exam.  I have discussed the patient's plan of care with the resident.   Documentation as above in the note.

## 2021-09-03 NOTE — ED CDU PROVIDER INITIAL DAY NOTE - OBJECTIVE STATEMENT
50F PMH of pSVT, HTN, HL on Enalipril with Bi[employer]ronic Loop recorder for 1 year p/w palpitations starting around midnight (racing, skipping, and flip-flopping) now less frequent. No CP or SOB. No fever.  ED course - HR 69, bp 131/86, rr 18, satting 99, temp 98, well appearing female no acute distress. labs pending  EKG Rate: 80 normal sinus rhythm

## 2021-09-03 NOTE — ED PROVIDER NOTE - NSICDXFAMILYHX_GEN_ALL_CORE_FT
Okay to take over-the-counter Tylenol for pain as well.    Follow-up closely with Orthopedics as this particular fracture can sometimes have difficulty healing.   FAMILY HISTORY:  FH: hypertension, mother

## 2021-09-03 NOTE — ED PROVIDER NOTE - OBJECTIVE STATEMENT
50F PMH of pSVT, HTN, HL on Enalipril with Biotronic Loop recorder for 1 year p/w palpitations starting around midnight (racing, skipping, and flip-flopping) now less frequent. No CP or SOB. No fever.

## 2021-09-03 NOTE — ED PROVIDER NOTE - NSICDXPASTSURGICALHX_GEN_ALL_CORE_FT
PAST SURGICAL HISTORY:  H/O bilateral salpingectomy     S/P breast lumpectomy right x3    S/P  section x3    Status post placement of implantable loop recorder 2018

## 2021-09-03 NOTE — ED PROVIDER NOTE - CLINICAL SUMMARY MEDICAL DECISION MAKING FREE TEXT BOX
Palpitations now improving. Tele monitoring. AM interrogation of loop recorder. r/o electrolyte abnormality/TSH.

## 2021-09-03 NOTE — ED ADULT NURSE NOTE - CHIEF COMPLAINT
----- Message from La Turner sent at 9/23/2019 10:59 AM CDT -----  Contact: pt  Calling for lab results.   
Spoke with pt, lab results given. Pt voiced understanding. Requesting copies to be mailed. Will print and mail result letter.   
The patient is a 50y Female complaining of palpitations.

## 2021-09-03 NOTE — ED ADULT NURSE NOTE - OBJECTIVE STATEMENT
Pt a&Ox4, ambulatory. in NAD respirations equal and unlabored. Pt state she's had a loop recorder for a year due to a "TIA and irregular heart beats". Pt a half a glass of wine around 6pm and a few hours later felt intermittent heart palpitations which also made her experience dizziness at the time. Pt states she takes baby aspirin every night. Pt denies; headache at this time, dizziness at this time, SOB, chest pain, n/v/c/d, urinary abnormalities. 20G to R AC labs drawn and sent as per MD order. Pt NSR on cardiac monitor.

## 2021-09-03 NOTE — ED CDU PROVIDER INITIAL DAY NOTE - NS ED ROS FT
Constitutional: No fever, chills.  Eyes:  No visual changes  ENMT:  No neck pain  Cardiac:  +palpitations   Respiratory:  No cough, SOB  GI:  No nausea, vomiting, diarrhea, abdominal pain.  :  No dysuria, hematuria  MS:  No back pain.  Neuro:  No headache or lightheadedness  Skin:  No skin rash  Except as documented in the HPI,  all other systems are negative.

## 2021-09-03 NOTE — ED CDU PROVIDER DISPOSITION NOTE - CLINICAL COURSE
pt presented with complaints of palpitations, has been asymptomatic since arrival in the ED. seen by EP and had loop recorder interrogated with no events noted. will discharge home to follow up with her cardiologist.

## 2021-09-03 NOTE — ED CDU PROVIDER INITIAL DAY NOTE - MEDICAL DECISION MAKING DETAILS
50F PMH of pSVT, HTN, HL on Enalipril with Biotronic Loop recorder for 1 year p/w palpitations starting around midnight (racing, skipping, and flip-flopping) now less frequent. vss, well appearing. pending labs    Plan:  Interrogate loop recorder in AM  Telemetry 50F PMH of pSVT, HTN, HL on Enalipril with Biotronic Loop recorder for 1 year p/w palpitations starting around midnight (racing, skipping, and flip-flopping) now less frequent. vss, well appearing. pending labs    Plan:  Interrogate loop recorder in AM  Telemetry  Follow up with Dr Nielsen Cardiology

## 2021-09-03 NOTE — ED CDU PROVIDER DISPOSITION NOTE - PATIENT PORTAL LINK FT
You can access the FollowMyHealth Patient Portal offered by [facility] by registering at the following website: [email or web address]. By joining [facility]’s FollowMyHealth portal, you will also be able to view your health information using other applications (apps) compatible with our system.

## 2021-09-03 NOTE — ED CDU PROVIDER DISPOSITION NOTE - NSFOLLOWUPINSTRUCTIONS_ED_ALL_ED_FT
you were seen in the emergency department today for palpitations.   you had blood work performed with no emergent findings.   you had your loop recorder interrogated by EP with no events noted.   please continue taking all prescribed home medications.   follow up with Dr. Nielsen this week.   return to the emergency department for any new or worsening symptoms.       A palpitation is the feeling that your heartbeat is irregular or is faster than normal. It may feel like your heart is fluttering or skipping a beat. They may be caused by many things, including smoking, caffeine, alcohol, stress, and certain medicines. Although most causes of palpitations are not serious, palpitations can be a sign of a serious medical problem. Avoid caffeine, alcohol, and tobacco products at home. Try to reduce stress and anxiety and make sure to get plenty of rest.     SEEK IMMEDIATE MEDICAL CARE IF YOU HAVE ANY OF THE FOLLOWING SYMPTOMS: chest pain, shortness of breath, severe headache, dizziness/lightheadedness, or fainting.

## 2021-09-03 NOTE — ED CDU PROVIDER INITIAL DAY NOTE - OBSERVATION MONITORING PLAN
7/5/2018         RE: Carolina Baugh  [address] [facility] [place] [address]        Dear Colleague,    Thank you for referring your patient, Carolina Baugh, to the [facility]. Please see a copy of my visit note below.    [facility] Dermatology Note      Dermatology Problem List:  1. Melasma and post inflammatory hyperpigmentation, zygoma  -Current Tx: 4% Hydroquinone (initiated 2/10/2017), Differin 0.1% gel (initiated 2/10/2017), and 10% Azelaic acid.   -Consider Tranaxemic acid.  -Previous Tx: Chemical peels (X1 sensitive skin peel, X1 20% sal 3% glycolic, X1 20%)  2. Dermatosis Papillomatosis nigra    Encounter Date: Jul 5, 2018    CC:  Chief Complaint   Patient presents with     Derm Problem     melasma follow up - had peels done and only helped a little bit - last peel was 4/12/17         History of Present Illness:  Ms. Carolina Baugh is a 60 year old female who presents as a follow up for melasma. Now usin ghydrowuinone on the cheeks. Has had improved. She did not take tranexamic acid. She did do chemical peels and this helped. No  finacea was used. Differin is being used. She stopped differin and hydroquinone stopped the last 6 weeks. Se is using sunscreen. Cetaphil sunscreen.     Past Medical History:   Patient Active Problem List   Diagnosis     Mild persistent asthma     Encounter for screening colonoscopy     CARDIOVASCULAR SCREENING; LDL GOAL LESS THAN 160     Sinusitis, chronic     Post-menopausal     Hypothyroidism     Mantoux: positive     Advance care planning     Cervicalgia     Episodic tension-type headache, not intractable     Past Medical History:   Diagnosis Date     Asthma      Dry eye syndrome      Encounter for screening colonoscopy 11/11/11    Normal, next 10 years 2021     Hyperthyroidism      MGD (meibomian gland disease)      Past Surgical History:   Procedure Laterality Date     BIOPSY BREAST Right      ENT SURGERY        Social History:  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  There is no family history of stroke, clotting disorder, bleeding disorder, or MI.   Kept in chart for convenience.       Medications:  Current Outpatient Prescriptions   Medication Sig Dispense Refill     Adapalene (DIFFERIN EX)        AZELAIC ACID EX        fexofenadine (ALLEGRA) 180 MG tablet 1 tablet daily. for allergy symptoms.       fluticasone (FLONASE) 50 MCG/ACT spray Spray 2 sprays into both nostrils daily 16 g 3     gabapentin (NEURONTIN) 300 MG capsule Take 1 capsule (300 mg) by mouth 2 times daily 60 capsule 1     hydroquinone 4 % CREA Apply twice a day to affected area of the face for 4 months max and then take a 1 month break. (Patient not taking: Reported on 7/5/2018) 60 g 3     levothyroxine (SYNTHROID/LEVOTHROID) 75 MCG tablet Take 1 tablet (75 mcg) by mouth daily 90 tablet 3     MULTIVITAMIN OR Take 1 tablet by mouth daily.       Omega-3 Fatty Acids (FISH OIL PO) Take 1 tablet by mouth daily as needed.       Polyvinyl Alcohol-Povidone (REFRESH OP) Apply to eye as needed       ranitidine (ZANTAC) 300 MG tablet Take 1 tablet (300 mg) by mouth At Bedtime 90 tablet 3     sulindac (CLINORIL) 200 MG tablet Take 1 tablet (200 mg) by mouth 2 times daily (with meals) 60 tablet 1     tiZANidine (ZANAFLEX) 4 MG tablet Take 1 tablet (4 mg) by mouth 3 times daily as needed for muscle spasms 60 tablet 0     No Known Allergies    Review of Systems:  -no recent ilnesses    Physical exam:  Vitals: LMP 01/13/2012  GEN: This is a well developed, well-nourished female in no acute distress, in a pleasant mood.    PSYCH: In pleasant mood, appropriate affect  SKIN: Focused examination of the face was performed.  -faint tan patches, lateral cheeks, medial cheeks improved  -No other lesions of concern on areas examined.     Impression/Plan:  1. Melasma: improved with chemical peels x3 and  application of 4% hydroquinone and Differin 0.1% gel. Likely worsening due to heat and sun in the summer. Also not using sunscreen stricly.     Continue hydroquinone 4% cream to the lateral cheeks for 4 weeks at night.     Hold differin    Start finnacea once daily    Sunscreen    Okay for 6 more checmical peels. Same strength as previously this fall when not tan    Keep clothing away from rubbing on face/wear loosely if possible.     Follow up in 3-4 months, earlier for new or changing lesions.     Staff Involved:    Tasha Magdaleno MD    Department of Dermatology  [facility] [place]: Phone: [phone], Fax:[phone]  [facility]: Phone: [phone], Fax: [phone]        Again, thank you for allowing me to participate in the care of your patient.        Sincerely,        Tasha Magdaleno MD     ED Record Reviewed

## 2021-09-03 NOTE — ED ADULT NURSE NOTE - NSIMPLEMENTINTERV_GEN_ALL_ED
Implemented All Universal Safety Interventions:  Issue to call system. Call bell, personal items and telephone within reach. Instruct patient to call for assistance. Room bathroom lighting operational. Non-slip footwear when patient is off stretcher. Physically safe environment: no spills, clutter or unnecessary equipment. Stretcher in lowest position, wheels locked, appropriate side rails in place.

## 2021-09-03 NOTE — PROCEDURE NOTE - ADDITIONAL PROCEDURE DETAILS
50F PMH of pSVT, TIA, HTN, HLD on Enalipril, metoprolol and ASA with [employer] LINQ II loop recorder implanted 11/2019 p/w palpitations starting described as skipping, and flip-flopping x 2 hours. In ED still with palpitations. Corresponding telemetry NSR. No CP or SOB. No fever.  Device interrogation:  No events. 50F PMH of  TIA, Bell's Palsy, GERD, HTN, HLD,  on enalapril, atorvastatin, metoprolol and aspirin with LINQ II loop recorder implanted 11/2019. Previous interrogations demonstrated sinus tachycardia with APC's/PVC's responding to metoprolol succinate ER 25mg daily. Today she presented with palpitations. Telemetry: NSR.  ILR interrogation: sinus rhythm with infrequent APC's.   Recommend:  Continue home medications. Would not increase beta blocker dose at this time.

## 2021-09-03 NOTE — ED ADULT TRIAGE NOTE - CHIEF COMPLAINT QUOTE
Pt st" I have a loop recorder and last night when laying down to sleep I was having palpitations...they are coming and going." Denies cp, denies shortness of breath, denies feeling light headed or dizzy. hx of htn. Pt st" I also have headache and my bp was high."

## 2021-09-14 ENCOUNTER — APPOINTMENT (OUTPATIENT)
Dept: ELECTROPHYSIOLOGY | Facility: CLINIC | Age: 51
End: 2021-09-14
Payer: COMMERCIAL

## 2021-09-14 ENCOUNTER — NON-APPOINTMENT (OUTPATIENT)
Age: 51
End: 2021-09-14

## 2021-09-14 PROCEDURE — G2066: CPT

## 2021-09-14 PROCEDURE — 93298 REM INTERROG DEV EVAL SCRMS: CPT

## 2021-09-17 ENCOUNTER — TRANSCRIPTION ENCOUNTER (OUTPATIENT)
Age: 51
End: 2021-09-17

## 2021-09-17 ENCOUNTER — EMERGENCY (EMERGENCY)
Facility: HOSPITAL | Age: 51
LOS: 1 days | Discharge: LEFT BEFORE DISCHARGE | End: 2021-09-17
Admitting: EMERGENCY MEDICINE
Payer: COMMERCIAL

## 2021-09-17 VITALS
HEIGHT: 59.5 IN | HEART RATE: 79 BPM | TEMPERATURE: 97 F | OXYGEN SATURATION: 100 % | DIASTOLIC BLOOD PRESSURE: 72 MMHG | RESPIRATION RATE: 16 BRPM | SYSTOLIC BLOOD PRESSURE: 115 MMHG

## 2021-09-17 DIAGNOSIS — Z98.89 OTHER SPECIFIED POSTPROCEDURAL STATES: Chronic | ICD-10-CM

## 2021-09-17 DIAGNOSIS — Z90.79 ACQUIRED ABSENCE OF OTHER GENITAL ORGAN(S): Chronic | ICD-10-CM

## 2021-09-17 DIAGNOSIS — Z95.818 PRESENCE OF OTHER CARDIAC IMPLANTS AND GRAFTS: Chronic | ICD-10-CM

## 2021-09-17 PROCEDURE — L9991: CPT

## 2021-09-17 NOTE — ED ADULT TRIAGE NOTE - EXPLANATION OF PATIENT'S REASON FOR LEAVING
Pt no longer wanted to be seen when she saw normal BP reading, pt encouraged to return for any concerns

## 2021-09-17 NOTE — ED ADULT TRIAGE NOTE - CHIEF COMPLAINT QUOTE
Pt states that she got a high BP reading on her home monitor and got nervous.  Pt offers no complaint.  Pt very well appearing.  PMH: HTN

## 2021-10-19 ENCOUNTER — APPOINTMENT (OUTPATIENT)
Dept: ELECTROPHYSIOLOGY | Facility: CLINIC | Age: 51
End: 2021-10-19
Payer: COMMERCIAL

## 2021-10-19 ENCOUNTER — NON-APPOINTMENT (OUTPATIENT)
Age: 51
End: 2021-10-19

## 2021-10-19 PROCEDURE — 93298 REM INTERROG DEV EVAL SCRMS: CPT

## 2021-10-19 PROCEDURE — G2066: CPT | Mod: NC

## 2021-11-02 ENCOUNTER — APPOINTMENT (OUTPATIENT)
Dept: NEUROLOGY | Facility: CLINIC | Age: 51
End: 2021-11-02
Payer: COMMERCIAL

## 2021-11-02 VITALS
HEIGHT: 59 IN | SYSTOLIC BLOOD PRESSURE: 134 MMHG | WEIGHT: 160 LBS | BODY MASS INDEX: 32.25 KG/M2 | HEART RATE: 76 BPM | DIASTOLIC BLOOD PRESSURE: 83 MMHG

## 2021-11-02 DIAGNOSIS — R29.810 FACIAL WEAKNESS: ICD-10-CM

## 2021-11-02 PROCEDURE — 99215 OFFICE O/P EST HI 40 MIN: CPT

## 2021-11-02 NOTE — REVIEW OF SYSTEMS
[As Noted in HPI] : as noted in HPI [Negative] : Heme/Lymph [Depression] : no depression [de-identified] : denies suicidal or homicial ideation

## 2021-11-02 NOTE — HISTORY OF PRESENT ILLNESS
[FreeTextEntry1] : Ms. Romero is a 51 year old female PMHx HTN who presents today for follow up after a TIA on 8/6/2019\par \par she has no new neurological symptoms. Her right face droop when she is tired; same side as her Right Bell's palsy.\par \par HPI (from hospital admission) \par Patient is a 48Y RH AA Female with PMH HTN who presented with L sided facial droop. LKN was 9:30am this morning. Stated that she went to put her daughter on the bus and started feeling very nauseous and began dry heaving. Went to work and had her BP checked which she stated was 120s systolic. Her work supervisor noticed her L sided facial droop and suggested she get it checked out. Patient herself did not notice any droop or facial numbness initially. She was initially evaluated by ED and was thought to have Bell's palsy. Upon re-evaluation, was noticed to be speaking differently and less dysarthric than prior. Patient also stated she had gait instability which was later explained as "floating on air" but did not have any actual weakness or numbness in her extremities. Code stroke called for new information- dysarthria and gait instability. CT Head was unremarkable for acute intracranial pathology. NIHSS: 0 (mild sensory loss of L V3 distribution)), MRS: 0 \par \par Workup:\par CT head 8.6.19 Unremarkable noncontrast head CT.\par \par MRI/MRA Head and neck 8.7.19\par Ventricles and sulci unremarkable in size. No restricted diffusion, hemorrhage, or midline shift. There is no abnormal enhancement in the brain parenchyma or meninges. Intracranial and extracranial MR angiography is unremarkable by NASCET criteria. Enlarged heterogeneous thyroid gland, suggest ultrasound for improved assessment.\par \par TTE NO PFO or source of embolism \par \par 10/28/20\par Yesterday (10/27) around 11 am she developed acute onset of left ptosis that lasted 24 hours and was not associated with any neurological signs or symptoms. She showed me pictures of the event and ptosis is subtle. She states she had a mild headache associated with it. 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 \par \par 1/19/21\par Her imaging after last reported episode is negative for any acute changes. Now she presents with a week of "right facial droop and talking about of the left side of her mouth". She states that the droop improves when she is not stressed and after she gets a good night sleep. She states that if she thinks about it she can fix it. Her sister gave her CBD to try and she reports it helps with her anxiety. She reports being under a lot of stress and is an anxious person. \par \par 4/28/21\par Overall she reports feeling well. She remains with intermittent right "facial droop" but is able to overcome it. She is compliant with her medications and recently started a statin but unsure which one. She has ILR but has not sent in readings in a while due to the battery cord being broken.

## 2021-11-02 NOTE — DISCUSSION/SUMMARY
[Antithrombotic therapy with ___] : antithrombotic therapy with  [unfilled] [Intensive Blood Pressure Control] : intensive blood pressure control [Patient encouraged to discuss with Primary MD] : I encouraged the patient to discuss these important issues with ~his/her~ primary care doctor [Goals and Counseling] : I have reviewed the goals of stroke risk factor modification. I counseled the patient on measures to reduce stroke risk, including the importance of medication compliance, risk factor control, exercise, healthy diet and avoidance of smoking. I reviewed stroke warning signs and symptoms and appropriate actions to take if such occur. [FreeTextEntry1] : Impression:\par 1. Right hemispheric TIA secondary to unknown etiology vs complex migraine \par 2. 24 hour onset of subtle left eye ptosis in setting of headache and questionable right facial droop unlikely vascular in nature . \par \par Intermittent right facial droop - likely non cerebrovascular given lack of other focal symptoms. Mostly happens when she is stressed or lack of sleep.\par \par \par Neurologically she is stable. Continue ASA 81 mg once daily for secondary stroke prevention. Continue to follow up with PCP/cardiology for BP and statin management (goal LDL <70) as well as all routine primary care needs. Follow up with cardiology for evaluation and management of ILR to screen for source of embolism - advised to call company for new power cord. We discussed aggressive vascular strict risk factor control.Encouraged her to use her glasses and get a antiglare screen for her computer to reduce eye strain. Discussed referral to a psychiatrist to address her stress and anxiety. \par \par  TCD and Doppler's to be obtained and reviewed with BL mild ICA stenosis. Patient and family were educated on signs and symptoms of stroke and to contact 911 immediately if experiencing any.

## 2021-11-03 ENCOUNTER — APPOINTMENT (OUTPATIENT)
Dept: CARDIOLOGY | Facility: CLINIC | Age: 51
End: 2021-11-03
Payer: COMMERCIAL

## 2021-11-03 ENCOUNTER — NON-APPOINTMENT (OUTPATIENT)
Age: 51
End: 2021-11-03

## 2021-11-03 VITALS
BODY MASS INDEX: 32.46 KG/M2 | DIASTOLIC BLOOD PRESSURE: 72 MMHG | RESPIRATION RATE: 12 BRPM | TEMPERATURE: 96.9 F | WEIGHT: 161 LBS | OXYGEN SATURATION: 97 % | SYSTOLIC BLOOD PRESSURE: 125 MMHG | HEIGHT: 59 IN | HEART RATE: 89 BPM

## 2021-11-03 PROCEDURE — 99214 OFFICE O/P EST MOD 30 MIN: CPT

## 2021-11-03 PROCEDURE — 93000 ELECTROCARDIOGRAM COMPLETE: CPT

## 2021-11-03 RX ORDER — LINACLOTIDE 145 UG/1
145 CAPSULE, GELATIN COATED ORAL
Qty: 90 | Refills: 0 | Status: DISCONTINUED | COMMUNITY
Start: 2021-06-04

## 2021-11-03 RX ORDER — SODIUM PICOSULFATE, MAGNESIUM OXIDE, AND ANHYDROUS CITRIC ACID 10; 3.5; 12 MG/160ML; G/160ML; G/160ML
10-3.5-12 MG-GM LIQUID ORAL
Qty: 320 | Refills: 0 | Status: DISCONTINUED | COMMUNITY
Start: 2021-05-27

## 2021-11-03 NOTE — REVIEW OF SYSTEMS
[Negative] : Heme/Lymph [Weight Gain (___ Lbs)] : no recent weight gain [Weight Loss (___ Lbs)] : [unfilled] ~Ulb weight loss [SOB] : no shortness of breath [Dyspnea on exertion] : not dyspnea during exertion [Chest Discomfort] : no chest discomfort [Lower Ext Edema] : no extremity edema [Leg Claudication] : no intermittent leg claudication [Palpitations] : no palpitations [Orthopnea] : no orthopnea [PND] : no PND [Syncope] : no syncope

## 2021-11-03 NOTE — DISCUSSION/SUMMARY
[___ Month(s)] : in [unfilled] month(s) [FreeTextEntry1] : The patient is a 51-year-old female hypertension, Bell's Palsy, ILR with palpitations secondary to GERD.\par #1 Htn- continue enalapril 10/25mg\par #2 Neuro-  Lanse Palsy\par - no PFO\par - miscarriages- hematology evaluation negative\par - carotid- doppler negative\par #3 Lipids- continue atorvastatin\par #4 ILR- APC and VPC only, continue toprol 12.5mg daily\par #5 GI- reflux causing palpitations, f/u GI\par #6 General- We discussed adherence to a Mediterranean diet, weight loss and at least 30 minutes of daily exercise. Recieved COVID vaccines.\par

## 2021-11-03 NOTE — HISTORY OF PRESENT ILLNESS
[FreeTextEntry1] : Aubree went to ER after home monitor was reading very high. The machine was actually broken. In hospital many episodes of palpitations but tele was negative. Turned out it was her reflux. Taking TUMS. Not exercising as much as summer but knows she needs to go back to bicycling and walking. Now back at work.

## 2021-11-18 NOTE — CONSULT NOTE ADULT - CONSULT REQUESTED DATE/TIME
Goal Outcome Evaluation:  Plan of Care Reviewed With: patient   Progress: no change   Pt. Is resting in bed no distress noted or labored breathing.  Will continue to monitor.   10-Oct-2019 14:44

## 2021-11-23 ENCOUNTER — APPOINTMENT (OUTPATIENT)
Dept: ELECTROPHYSIOLOGY | Facility: CLINIC | Age: 51
End: 2021-11-23
Payer: COMMERCIAL

## 2021-11-23 ENCOUNTER — NON-APPOINTMENT (OUTPATIENT)
Age: 51
End: 2021-11-23

## 2021-11-23 PROCEDURE — G2066: CPT

## 2021-11-23 PROCEDURE — 93298 REM INTERROG DEV EVAL SCRMS: CPT

## 2021-12-28 ENCOUNTER — NON-APPOINTMENT (OUTPATIENT)
Age: 51
End: 2021-12-28

## 2021-12-28 ENCOUNTER — APPOINTMENT (OUTPATIENT)
Dept: ELECTROPHYSIOLOGY | Facility: CLINIC | Age: 51
End: 2021-12-28
Payer: COMMERCIAL

## 2021-12-28 PROCEDURE — G2066: CPT

## 2021-12-28 PROCEDURE — 93298 REM INTERROG DEV EVAL SCRMS: CPT

## 2022-01-31 ENCOUNTER — APPOINTMENT (OUTPATIENT)
Dept: ELECTROPHYSIOLOGY | Facility: CLINIC | Age: 52
End: 2022-01-31
Payer: COMMERCIAL

## 2022-01-31 ENCOUNTER — NON-APPOINTMENT (OUTPATIENT)
Age: 52
End: 2022-01-31

## 2022-01-31 PROCEDURE — 93298 REM INTERROG DEV EVAL SCRMS: CPT

## 2022-01-31 PROCEDURE — G2066: CPT

## 2022-03-07 ENCOUNTER — NON-APPOINTMENT (OUTPATIENT)
Age: 52
End: 2022-03-07

## 2022-03-07 ENCOUNTER — APPOINTMENT (OUTPATIENT)
Dept: ELECTROPHYSIOLOGY | Facility: CLINIC | Age: 52
End: 2022-03-07
Payer: COMMERCIAL

## 2022-03-07 PROCEDURE — 93298 REM INTERROG DEV EVAL SCRMS: CPT

## 2022-03-07 PROCEDURE — G2066: CPT

## 2022-04-11 ENCOUNTER — NON-APPOINTMENT (OUTPATIENT)
Age: 52
End: 2022-04-11

## 2022-04-11 ENCOUNTER — APPOINTMENT (OUTPATIENT)
Dept: ELECTROPHYSIOLOGY | Facility: CLINIC | Age: 52
End: 2022-04-11
Payer: COMMERCIAL

## 2022-04-11 PROCEDURE — 93298 REM INTERROG DEV EVAL SCRMS: CPT

## 2022-04-11 PROCEDURE — G2066: CPT

## 2022-04-20 NOTE — ED PROVIDER NOTE - TOBACCO USE
Prednisone Pregnancy And Lactation Text: This medication is Pregnancy Category C and it isn't know if it is safe during pregnancy. This medication is excreted in breast milk. Fluconazole Pregnancy And Lactation Text: This medication is Pregnancy Category C and it isn't know if it is safe during pregnancy. It is also excreted in breast milk. Gabapentin Counseling: I discussed with the patient the risks of gabapentin including but not limited to dizziness, somnolence, fatigue and ataxia. Tremfya Pregnancy And Lactation Text: The risk during pregnancy and breastfeeding is uncertain with this medication. Terbinafine Pregnancy And Lactation Text: This medication is Pregnancy Category B and is considered safe during pregnancy. It is also excreted in breast milk and breast feeding isn't recommended. Ivermectin Pregnancy And Lactation Text: This medication is Pregnancy Category C and it isn't known if it is safe during pregnancy. It is also excreted in breast milk. Imiquimod Pregnancy And Lactation Text: This medication is Pregnancy Category C. It is unknown if this medication is excreted in breast milk. Protopic Pregnancy And Lactation Text: This medication is Pregnancy Category C. It is unknown if this medication is excreted in breast milk when applied topically. Methotrexate Counseling:  Patient counseled regarding adverse effects of methotrexate including but not limited to nausea, vomiting, abnormalities in liver function tests. Patients may develop mouth sores, rash, diarrhea, and abnormalities in blood counts. The patient understands that monitoring is required including LFT's and blood counts.  There is a rare possibility of scarring of the liver and lung problems that can occur when taking methotrexate. Persistent nausea, loss of appetite, pale stools, dark urine, cough, and shortness of breath should be reported immediately. Patient advised to discontinue methotrexate treatment at least three months before attempting to become pregnant.  I discussed the need for folate supplements while taking methotrexate.  These supplements can decrease side effects during methotrexate treatment. The patient verbalized understanding of the proper use and possible adverse effects of methotrexate.  All of the patient's questions and concerns were addressed. Clindamycin Pregnancy And Lactation Text: This medication can be used in pregnancy if certain situations. Clindamycin is also present in breast milk. Enbrel Pregnancy And Lactation Text: This medication is Pregnancy Category B and is considered safe during pregnancy. It is unknown if this medication is excreted in breast milk. Never smoker Clofazimine Counseling:  I discussed with the patient the risks of clofazimine including but not limited to skin and eye pigmentation, liver damage, nausea/vomiting, gastrointestinal bleeding and allergy. Elidel Counseling: Patient may experience a mild burning sensation during topical application. Elidel is not approved in children less than 2 years of age. There have been case reports of hematologic and skin malignancies in patients using topical calcineurin inhibitors although causality is questionable. Arava Pregnancy And Lactation Text: This medication is Pregnancy Category X and is absolutely contraindicated during pregnancy. It is unknown if it is excreted in breast milk. Doxycycline Pregnancy And Lactation Text: This medication is Pregnancy Category D and not consider safe during pregnancy. It is also excreted in breast milk but is considered safe for shorter treatment courses. Cellcept Counseling:  I discussed with the patient the risks of mycophenolate mofetil including but not limited to infection/immunosuppression, GI upset, hypokalemia, hypercholesterolemia, bone marrow suppression, lymphoproliferative disorders, malignancy, GI ulceration/bleed/perforation, colitis, interstitial lung disease, kidney failure, progressive multifocal leukoencephalopathy, and birth defects.  The patient understands that monitoring is required including a baseline creatinine and regular CBC testing. In addition, patient must alert us immediately if symptoms of infection or other concerning signs are noted. Include Pregnancy/Lactation Warning?: No Hydroxychloroquine Counseling:  I discussed with the patient that a baseline ophthalmologic exam is needed at the start of therapy and every year thereafter while on therapy. A CBC may also be warranted for monitoring.  The side effects of this medication were discussed with the patient, including but not limited to agranulocytosis, aplastic anemia, seizures, rashes, retinopathy, and liver toxicity. Patient instructed to call the office should any adverse effect occur.  The patient verbalized understanding of the proper use and possible adverse effects of Plaquenil.  All the patient's questions and concerns were addressed. Benzoyl Peroxide Pregnancy And Lactation Text: This medication is Pregnancy Category C. It is unknown if benzoyl peroxide is excreted in breast milk. Solaraze Pregnancy And Lactation Text: This medication is Pregnancy Category B and is considered safe. There is some data to suggest avoiding during the third trimester. It is unknown if this medication is excreted in breast milk. Xelalethaz Pregnancy And Lactation Text: This medication is Pregnancy Category D and is not considered safe during pregnancy.  The risk during breast feeding is also uncertain. Albendazole Counseling:  I discussed with the patient the risks of albendazole including but not limited to cytopenia, kidney damage, nausea/vomiting and severe allergy.  The patient understands that this medication is being used in an off-label manner. Sarecycline Pregnancy And Lactation Text: This medication is Pregnancy Category D and not consider safe during pregnancy. It is also excreted in breast milk. Hydroxyzine Pregnancy And Lactation Text: This medication is not safe during pregnancy and should not be taken. It is also excreted in breast milk and breast feeding isn't recommended. Hydroxychloroquine Pregnancy And Lactation Text: This medication has been shown to cause fetal harm but it isn't assigned a Pregnancy Risk Category. There are small amounts excreted in breast milk. Acitretin Counseling:  I discussed with the patient the risks of acitretin including but not limited to hair loss, dry lips/skin/eyes, liver damage, hyperlipidemia, depression/suicidal ideation, photosensitivity.  Serious rare side effects can include but are not limited to pancreatitis, pseudotumor cerebri, bony changes, clot formation/stroke/heart attack.  Patient understands that alcohol is contraindicated since it can result in liver toxicity and significantly prolong the elimination of the drug by many years. Colchicine Pregnancy And Lactation Text: This medication is Pregnancy Category C and isn't considered safe during pregnancy. It is excreted in breast milk. Topical Sulfur Applications Pregnancy And Lactation Text: This medication is Pregnancy Category C and has an unknown safety profile during pregnancy. It is unknown if this topical medication is excreted in breast milk. Hydroxyzine Counseling: Patient advised that the medication is sedating and not to drive a car after taking this medication.  Patient informed of potential adverse effects including but not limited to dry mouth, urinary retention, and blurry vision.  The patient verbalized understanding of the proper use and possible adverse effects of hydroxyzine.  All of the patient's questions and concerns were addressed. Infliximab Counseling:  I discussed with the patient the risks of infliximab including but not limited to myelosuppression, immunosuppression, autoimmune hepatitis, demyelinating diseases, lymphoma, and serious infections.  The patient understands that monitoring is required including a PPD at baseline and must alert us or the primary physician if symptoms of infection or other concerning signs are noted. Arava Counseling:  Patient counseled regarding adverse effects of Arava including but not limited to nausea, vomiting, abnormalities in liver function tests. Patients may develop mouth sores, rash, diarrhea, and abnormalities in blood counts. The patient understands that monitoring is required including LFTs and blood counts.  There is a rare possibility of scarring of the liver and lung problems that can occur when taking methotrexate. Persistent nausea, loss of appetite, pale stools, dark urine, cough, and shortness of breath should be reported immediately. Patient advised to discontinue Arava treatment and consult with a physician prior to attempting conception. The patient will have to undergo a treatment to eliminate Arava from the body prior to conception. Dapsone Pregnancy And Lactation Text: This medication is Pregnancy Category C and is not considered safe during pregnancy or breast feeding. Dupixent Pregnancy And Lactation Text: This medication likely crosses the placenta but the risk for the fetus is uncertain. This medication is excreted in breast milk. High Dose Vitamin A Pregnancy And Lactation Text: High dose vitamin A therapy is contraindicated during pregnancy and breast feeding. Doxepin Counseling:  Patient advised that the medication is sedating and not to drive a car after taking this medication. Patient informed of potential adverse effects including but not limited to dry mouth, urinary retention, and blurry vision.  The patient verbalized understanding of the proper use and possible adverse effects of doxepin.  All of the patient's questions and concerns were addressed. Cephalexin Pregnancy And Lactation Text: This medication is Pregnancy Category B and considered safe during pregnancy.  It is also excreted in breast milk but can be used safely for shorter doses. Ivermectin Counseling:  Patient instructed to take medication on an empty stomach with a full glass of water.  Patient informed of potential adverse effects including but not limited to nausea, diarrhea, dizziness, itching, and swelling of the extremities or lymph nodes.  The patient verbalized understanding of the proper use and possible adverse effects of ivermectin.  All of the patient's questions and concerns were addressed. Humira Counseling:  I discussed with the patient the risks of adalimumab including but not limited to myelosuppression, immunosuppression, autoimmune hepatitis, demyelinating diseases, lymphoma, and serious infections.  The patient understands that monitoring is required including a PPD at baseline and must alert us or the primary physician if symptoms of infection or other concerning signs are noted. Prednisone Counseling:  I discussed with the patient the risks of prolonged use of prednisone including but not limited to weight gain, insomnia, osteoporosis, mood changes, diabetes, susceptibility to infection, glaucoma and high blood pressure.  In cases where prednisone use is prolonged, patients should be monitored with blood pressure checks, serum glucose levels and an eye exam.  Additionally, the patient may need to be placed on GI prophylaxis, PCP prophylaxis, and calcium and vitamin D supplementation and/or a bisphosphonate.  The patient verbalized understanding of the proper use and the possible adverse effects of prednisone.  All of the patient's questions and concerns were addressed. Cellcept Pregnancy And Lactation Text: This medication is Pregnancy Category D and isn't considered safe during pregnancy. It is unknown if this medication is excreted in breast milk. Metronidazole Pregnancy And Lactation Text: This medication is Pregnancy Category B and considered safe during pregnancy.  It is also excreted in breast milk. Hydroquinone Counseling:  Patient advised that medication may result in skin irritation, lightening (hypopigmentation), dryness, and burning.  In the event of skin irritation, the patient was advised to reduce the amount of the drug applied or use it less frequently.  Rarely, spots that are treated with hydroquinone can become darker (pseudoochronosis).  Should this occur, patient instructed to stop medication and call the office. The patient verbalized understanding of the proper use and possible adverse effects of hydroquinone.  All of the patient's questions and concerns were addressed. Solaraze Counseling:  I discussed with the patient the risks of Solaraze including but not limited to erythema, scaling, itching, weeping, crusting, and pain. Nsaids Counseling: NSAID Counseling: I discussed with the patient that NSAIDs should be taken with food. Prolonged use of NSAIDs can result in the development of stomach ulcers.  Patient advised to stop taking NSAIDs if abdominal pain occurs.  The patient verbalized understanding of the proper use and possible adverse effects of NSAIDs.  All of the patient's questions and concerns were addressed. Valtrex Pregnancy And Lactation Text: this medication is Pregnancy Category B and is considered safe during pregnancy. This medication is not directly found in breast milk but it's metabolite acyclovir is present. Cyclophosphamide Pregnancy And Lactation Text: This medication is Pregnancy Category D and it isn't considered safe during pregnancy. This medication is excreted in breast milk. Stelara Counseling:  I discussed with the patient the risks of ustekinumab including but not limited to immunosuppression, malignancy, posterior leukoencephalopathy syndrome, and serious infections.  The patient understands that monitoring is required including a PPD at baseline and must alert us or the primary physician if symptoms of infection or other concerning signs are noted. Rifampin Pregnancy And Lactation Text: This medication is Pregnancy Category C and it isn't know if it is safe during pregnancy. It is also excreted in breast milk and should not be used if you are breast feeding. Birth Control Pills Counseling: Birth Control Pill Counseling: I discussed with the patient the potential side effects of OCPs including but not limited to increased risk of stroke, heart attack, thrombophlebitis, deep venous thrombosis, hepatic adenomas, breast changes, GI upset, headaches, and depression.  The patient verbalized understanding of the proper use and possible adverse effects of OCPs. All of the patient's questions and concerns were addressed. Fluconazole Counseling:  Patient counseled regarding adverse effects of fluconazole including but not limited to headache, diarrhea, nausea, upset stomach, liver function test abnormalities, taste disturbance, and stomach pain.  There is a rare possibility of liver failure that can occur when taking fluconazole.  The patient understands that monitoring of LFTs and kidney function test may be required, especially at baseline. The patient verbalized understanding of the proper use and possible adverse effects of fluconazole.  All of the patient's questions and concerns were addressed. Bexarotene Counseling:  I discussed with the patient the risks of bexarotene including but not limited to hair loss, dry lips/skin/eyes, liver abnormalities, hyperlipidemia, pancreatitis, depression/suicidal ideation, photosensitivity, drug rash/allergic reactions, hypothyroidism, anemia, leukopenia, infection, cataracts, and teratogenicity.  Patient understands that they will need regular blood tests to check lipid profile, liver function tests, white blood cell count, thyroid function tests and pregnancy test if applicable. Minocycline Counseling: Patient advised regarding possible photosensitivity and discoloration of the teeth, skin, lips, tongue and gums.  Patient instructed to avoid sunlight, if possible.  When exposed to sunlight, patients should wear protective clothing, sunglasses, and sunscreen.  The patient was instructed to call the office immediately if the following severe adverse effects occur:  hearing changes, easy bruising/bleeding, severe headache, or vision changes.  The patient verbalized understanding of the proper use and possible adverse effects of minocycline.  All of the patient's questions and concerns were addressed. Ketoconazole Counseling:   Patient counseled regarding improving absorption with orange juice.  Adverse effects include but are not limited to breast enlargement, headache, diarrhea, nausea, upset stomach, liver function test abnormalities, taste disturbance, and stomach pain.  There is a rare possibility of liver failure that can occur when taking ketoconazole. The patient understands that monitoring of LFTs may be required, especially at baseline. The patient verbalized understanding of the proper use and possible adverse effects of ketoconazole.  All of the patient's questions and concerns were addressed. Picato Counseling:  I discussed with the patient the risks of Picato including but not limited to erythema, scaling, itching, weeping, crusting, and pain. Dapsone Counseling: I discussed with the patient the risks of dapsone including but not limited to hemolytic anemia, agranulocytosis, rashes, methemoglobinemia, kidney failure, peripheral neuropathy, headaches, GI upset, and liver toxicity.  Patients who start dapsone require monitoring including baseline LFTs and weekly CBCs for the first month, then every month thereafter.  The patient verbalized understanding of the proper use and possible adverse effects of dapsone.  All of the patient's questions and concerns were addressed. Cimzia Counseling:  I discussed with the patient the risks of Cimzia including but not limited to immunosuppression, allergic reactions and infections.  The patient understands that monitoring is required including a PPD at baseline and must alert us or the primary physician if symptoms of infection or other concerning signs are noted. Xeljanz Counseling: I discussed with the patient the risks of Xeljanz therapy including increased risk of infection, liver issues, headache, diarrhea, or cold symptoms. Live vaccines should be avoided. They were instructed to call if they have any problems. 5-Fu Pregnancy And Lactation Text: This medication is Pregnancy Category X and contraindicated in pregnancy and in women who may become pregnant. It is unknown if this medication is excreted in breast milk. 5-Fu Counseling: 5-Fluorouracil Counseling:  I discussed with the patient the risks of 5-fluorouracil including but not limited to erythema, scaling, itching, weeping, crusting, and pain. Griseofulvin Pregnancy And Lactation Text: This medication is Pregnancy Category X and is known to cause serious birth defects. It is unknown if this medication is excreted in breast milk but breast feeding should be avoided. Rituxan Pregnancy And Lactation Text: This medication is Pregnancy Category C and it isn't know if it is safe during pregnancy. It is unknown if this medication is excreted in breast milk but similar antibodies are known to be excreted. Imiquimod Counseling:  I discussed with the patient the risks of imiquimod including but not limited to erythema, scaling, itching, weeping, crusting, and pain.  Patient understands that the inflammatory response to imiquimod is variable from person to person and was educated regarded proper titration schedule.  If flu-like symptoms develop, patient knows to discontinue the medication and contact us. Topical Sulfur Applications Counseling: Topical Sulfur Counseling: Patient counseled that this medication may cause skin irritation or allergic reactions.  In the event of skin irritation, the patient was advised to reduce the amount of the drug applied or use it less frequently.   The patient verbalized understanding of the proper use and possible adverse effects of topical sulfur application.  All of the patient's questions and concerns were addressed. Enbrel Counseling:  I discussed with the patient the risks of etanercept including but not limited to myelosuppression, immunosuppression, autoimmune hepatitis, demyelinating diseases, lymphoma, and infections.  The patient understands that monitoring is required including a PPD at baseline and must alert us or the primary physician if symptoms of infection or other concerning signs are noted. Benzoyl Peroxide Counseling: Patient counseled that medicine may cause skin irritation and bleach clothing.  In the event of skin irritation, the patient was advised to reduce the amount of the drug applied or use it less frequently.   The patient verbalized understanding of the proper use and possible adverse effects of benzoyl peroxide.  All of the patient's questions and concerns were addressed. Siliq Counseling:  I discussed with the patient the risks of Siliq including but not limited to new or worsening depression, suicidal thoughts and behavior, immunosuppression, malignancy, posterior leukoencephalopathy syndrome, and serious infections.  The patient understands that monitoring is required including a PPD at baseline and must alert us or the primary physician if symptoms of infection or other concerning signs are noted. There is also a special program designed to monitor depression which is required with Siliq. Azithromycin Pregnancy And Lactation Text: This medication is considered safe during pregnancy and is also secreted in breast milk. Valtrex Counseling: I discussed with the patient the risks of valacyclovir including but not limited to kidney damage, nausea, vomiting and severe allergy.  The patient understands that if the infection seems to be worsening or is not improving, they are to call. Zyclara Counseling:  I discussed with the patient the risks of imiquimod including but not limited to erythema, scaling, itching, weeping, crusting, and pain.  Patient understands that the inflammatory response to imiquimod is variable from person to person and was educated regarded proper titration schedule.  If flu-like symptoms develop, patient knows to discontinue the medication and contact us. Tetracycline Counseling: Patient counseled regarding possible photosensitivity and increased risk for sunburn.  Patient instructed to avoid sunlight, if possible.  When exposed to sunlight, patients should wear protective clothing, sunglasses, and sunscreen.  The patient was instructed to call the office immediately if the following severe adverse effects occur:  hearing changes, easy bruising/bleeding, severe headache, or vision changes.  The patient verbalized understanding of the proper use and possible adverse effects of tetracycline.  All of the patient's questions and concerns were addressed. Patient understands to avoid pregnancy while on therapy due to potential birth defects. Taltz Counseling: I discussed with the patient the risks of ixekizumab including but not limited to immunosuppression, serious infections, worsening of inflammatory bowel disease and drug reactions.  The patient understands that monitoring is required including a PPD at baseline and must alert us or the primary physician if symptoms of infection or other concerning signs are noted. Oxybutynin Counseling:  I discussed with the patient the risks of oxybutynin including but not limited to skin rash, drowsiness, dry mouth, difficulty urinating, and blurred vision. Protopic Counseling: Patient may experience a mild burning sensation during topical application. Protopic is not approved in children less than 2 years of age. There have been case reports of hematologic and skin malignancies in patients using topical calcineurin inhibitors although causality is questionable. Azathioprine Counseling:  I discussed with the patient the risks of azathioprine including but not limited to myelosuppression, immunosuppression, hepatotoxicity, lymphoma, and infections.  The patient understands that monitoring is required including baseline LFTs, Creatinine, possible TPMP genotyping and weekly CBCs for the first month and then every 2 weeks thereafter.  The patient verbalized understanding of the proper use and possible adverse effects of azathioprine.  All of the patient's questions and concerns were addressed. Carac Counseling:  I discussed with the patient the risks of Carac including but not limited to erythema, scaling, itching, weeping, crusting, and pain. Rituxan Counseling:  I discussed with the patient the risks of Rituxan infusions. Side effects can include infusion reactions, severe drug rashes including mucocutaneous reactions, reactivation of latent hepatitis and other infections and rarely progressive multifocal leukoencephalopathy.  All of the patient's questions and concerns were addressed. Drysol Counseling:  I discussed with the patient the risks of drysol/aluminum chloride including but not limited to skin rash, itching, irritation, burning. Cosentyx Counseling:  I discussed with the patient the risks of Cosentyx including but not limited to worsening of Crohn's disease, immunosuppression, allergic reactions and infections.  The patient understands that monitoring is required including a PPD at baseline and must alert us or the primary physician if symptoms of infection or other concerning signs are noted. Simponi Counseling:  I discussed with the patient the risks of golimumab including but not limited to myelosuppression, immunosuppression, autoimmune hepatitis, demyelinating diseases, lymphoma, and serious infections.  The patient understands that monitoring is required including a PPD at baseline and must alert us or the primary physician if symptoms of infection or other concerning signs are noted. Erythromycin Pregnancy And Lactation Text: This medication is Pregnancy Category B and is considered safe during pregnancy. It is also excreted in breast milk. SSKI Counseling:  I discussed with the patient the risks of SSKI including but not limited to thyroid abnormalities, metallic taste, GI upset, fever, headache, acne, arthralgias, paraesthesias, lymphadenopathy, easy bleeding, arrhythmias, and allergic reaction. Detail Level: Detailed Cephalexin Counseling: I counseled the patient regarding use of cephalexin as an antibiotic for prophylactic and/or therapeutic purposes. Cephalexin (commonly prescribed under brand name Keflex) is a cephalosporin antibiotic which is active against numerous classes of bacteria, including most skin bacteria. Side effects may include nausea, diarrhea, gastrointestinal upset, rash, hives, yeast infections, and in rare cases, hepatitis, kidney disease, seizures, fever, confusion, neurologic symptoms, and others. Patients with severe allergies to penicillin medications are cautioned that there is about a 10% incidence of cross-reactivity with cephalosporins. When possible, patients with penicillin allergies should use alternatives to cephalosporins for antibiotic therapy. Eucrisa Counseling: Patient may experience a mild burning sensation during topical application. Eucrisa is not approved in children less than 2 years of age. Spironolactone Counseling: Patient advised regarding risks of diarrhea, abdominal pain, hyperkalemia, birth defects (for female patients), liver toxicity and renal toxicity. The patient may need blood work to monitor liver and kidney function and potassium levels while on therapy. The patient verbalized understanding of the proper use and possible adverse effects of spironolactone.  All of the patient's questions and concerns were addressed. Xolair Counseling:  Patient informed of potential adverse effects including but not limited to fever, muscle aches, rash and allergic reactions.  The patient verbalized understanding of the proper use and possible adverse effects of Xolair.  All of the patient's questions and concerns were addressed. Clindamycin Counseling: I counseled the patient regarding use of clindamycin as an antibiotic for prophylactic and/or therapeutic purposes. Clindamycin is active against numerous classes of bacteria, including skin bacteria. Side effects may include nausea, diarrhea, gastrointestinal upset, rash, hives, yeast infections, and in rare cases, colitis. Isotretinoin Counseling: Patient should get monthly blood tests, not donate blood, not drive at night if vision affected, not share medication, and not undergo elective surgery for 6 months after tx completed. Side effects reviewed, pt to contact office should one occur. Erythromycin Counseling:  I discussed with the patient the risks of erythromycin including but not limited to GI upset, allergic reaction, drug rash, diarrhea, increase in liver enzymes, and yeast infections. Topical Clindamycin Counseling: Patient counseled that this medication may cause skin irritation or allergic reactions.  In the event of skin irritation, the patient was advised to reduce the amount of the drug applied or use it less frequently.   The patient verbalized understanding of the proper use and possible adverse effects of clindamycin.  All of the patient's questions and concerns were addressed. Isotretinoin Pregnancy And Lactation Text: This medication is Pregnancy Category X and is considered extremely dangerous during pregnancy. It is unknown if it is excreted in breast milk. Doxycycline Counseling:  Patient counseled regarding possible photosensitivity and increased risk for sunburn.  Patient instructed to avoid sunlight, if possible.  When exposed to sunlight, patients should wear protective clothing, sunglasses, and sunscreen.  The patient was instructed to call the office immediately if the following severe adverse effects occur:  hearing changes, easy bruising/bleeding, severe headache, or vision changes.  The patient verbalized understanding of the proper use and possible adverse effects of doxycycline.  All of the patient's questions and concerns were addressed. Dupixent Counseling: I discussed with the patient the risks of dupilumab including but not limited to eye infection and irritation, cold sores, injection site reactions, worsening of asthma, allergic reactions and increased risk of parasitic infection.  Live vaccines should be avoided while taking dupilumab. Dupilumab will also interact with certain medications such as warfarin and cyclosporine. The patient understands that monitoring is required and they must alert us or the primary physician if symptoms of infection or other concerning signs are noted. Cimetidine Counseling:  I discussed with the patient the risks of Cimetidine including but not limited to gynecomastia, headache, diarrhea, nausea, drowsiness, arrhythmias, pancreatitis, skin rashes, psychosis, bone marrow suppression and kidney toxicity. Doxepin Pregnancy And Lactation Text: This medication is Pregnancy Category C and it isn't known if it is safe during pregnancy. It is also excreted in breast milk and breast feeding isn't recommended. Metronidazole Counseling:  I discussed with the patient the risks of metronidazole including but not limited to seizures, nausea/vomiting, a metallic taste in the mouth, nausea/vomiting and severe allergy. Minoxidil Pregnancy And Lactation Text: This medication has not been assigned a Pregnancy Risk Category but animal studies failed to show danger with the topical medication. It is unknown if the medication is excreted in breast milk. Ilumya Counseling: I discussed with the patient the risks of tildrakizumab including but not limited to immunosuppression, malignancy, posterior leukoencephalopathy syndrome, and serious infections.  The patient understands that monitoring is required including a PPD at baseline and must alert us or the primary physician if symptoms of infection or other concerning signs are noted. Glycopyrrolate Counseling:  I discussed with the patient the risks of glycopyrrolate including but not limited to skin rash, drowsiness, dry mouth, difficulty urinating, and blurred vision. Rifampin Counseling: I discussed with the patient the risks of rifampin including but not limited to liver damage, kidney damage, red-orange body fluids, nausea/vomiting and severe allergy. Terbinafine Counseling: Patient counseling regarding adverse effects of terbinafine including but not limited to headache, diarrhea, rash, upset stomach, liver function test abnormalities, itching, taste/smell disturbance, nausea, abdominal pain, and flatulence.  There is a rare possibility of liver failure that can occur when taking terbinafine.  The patient understands that a baseline LFT and kidney function test may be required. The patient verbalized understanding of the proper use and possible adverse effects of terbinafine.  All of the patient's questions and concerns were addressed. Bactrim Counseling:  I discussed with the patient the risks of sulfa antibiotics including but not limited to GI upset, allergic reaction, drug rash, diarrhea, dizziness, photosensitivity, and yeast infections.  Rarely, more serious reactions can occur including but not limited to aplastic anemia, agranulocytosis, methemoglobinemia, blood dyscrasias, liver or kidney failure, lung infiltrates or desquamative/blistering drug rashes. Drysol Pregnancy And Lactation Text: This medication is considered safe during pregnancy and breast feeding. Sarecycline Counseling: Patient advised regarding possible photosensitivity and discoloration of the teeth, skin, lips, tongue and gums.  Patient instructed to avoid sunlight, if possible.  When exposed to sunlight, patients should wear protective clothing, sunglasses, and sunscreen.  The patient was instructed to call the office immediately if the following severe adverse effects occur:  hearing changes, easy bruising/bleeding, severe headache, or vision changes.  The patient verbalized understanding of the proper use and possible adverse effects of sarecycline.  All of the patient's questions and concerns were addressed. Quinolones Counseling:  I discussed with the patient the risks of fluoroquinolones including but not limited to GI upset, allergic reaction, drug rash, diarrhea, dizziness, photosensitivity, yeast infections, liver function test abnormalities, tendonitis/tendon rupture. Methotrexate Pregnancy And Lactation Text: This medication is Pregnancy Category X and is known to cause fetal harm. This medication is excreted in breast milk. Tazorac Counseling:  Patient advised that medication is irritating and drying.  Patient may need to apply sparingly and wash off after an hour before eventually leaving it on overnight.  The patient verbalized understanding of the proper use and possible adverse effects of tazorac.  All of the patient's questions and concerns were addressed. Acitretin Pregnancy And Lactation Text: This medication is Pregnancy Category X and should not be given to women who are pregnant or may become pregnant in the future. This medication is excreted in breast milk. Skyrizi Counseling: I discussed with the patient the risks of risankizumab-rzaa including but not limited to immunosuppression, and serious infections.  The patient understands that monitoring is required including a PPD at baseline and must alert us or the primary physician if symptoms of infection or other concerning signs are noted. Bactrim Pregnancy And Lactation Text: This medication is Pregnancy Category D and is known to cause fetal risk.  It is also excreted in breast milk. Otezla Pregnancy And Lactation Text: This medication is Pregnancy Category C and it isn't known if it is safe during pregnancy. It is unknown if it is excreted in breast milk. Ketoconazole Pregnancy And Lactation Text: This medication is Pregnancy Category C and it isn't know if it is safe during pregnancy. It is also excreted in breast milk and breast feeding isn't recommended. Cyclophosphamide Counseling:  I discussed with the patient the risks of cyclophosphamide including but not limited to hair loss, hormonal abnormalities, decreased fertility, abdominal pain, diarrhea, nausea and vomiting, bone marrow suppression and infection. The patient understands that monitoring is required while taking this medication. Birth Control Pills Pregnancy And Lactation Text: This medication should be avoided if pregnant and for the first 30 days post-partum. Thalidomide Counseling: I discussed with the patient the risks of thalidomide including but not limited to birth defects, anxiety, weakness, chest pain, dizziness, cough and severe allergy. Cyclosporine Counseling:  I discussed with the patient the risks of cyclosporine including but not limited to hypertension, gingival hyperplasia,myelosuppression, immunosuppression, liver damage, kidney damage, neurotoxicity, lymphoma, and serious infections. The patient understands that monitoring is required including baseline blood pressure, CBC, CMP, lipid panel and uric acid, and then 1-2 times monthly CMP and blood pressure. Itraconazole Counseling:  I discussed with the patient the risks of itraconazole including but not limited to liver damage, nausea/vomiting, neuropathy, and severe allergy.  The patient understands that this medication is best absorbed when taken with acidic beverages such as non-diet cola or ginger ale.  The patient understands that monitoring is required including baseline LFTs and repeat LFTs at intervals.  The patient understands that they are to contact us or the primary physician if concerning signs are noted. Odomzo Counseling- I discussed with the patient the risks of Odomzo including but not limited to nausea, vomiting, diarrhea, constipation, weight loss, changes in the sense of taste, decreased appetite, muscle spasms, and hair loss.  The patient verbalized understanding of the proper use and possible adverse effects of Odomzo.  All of the patient's questions and concerns were addressed. Xolair Pregnancy And Lactation Text: This medication is Pregnancy Category B and is considered safe during pregnancy. This medication is excreted in breast milk. Otezla Counseling: The side effects of Otezla were discussed with the patient, including but not limited to worsening or new depression, weight loss, diarrhea, nausea, upper respiratory tract infection, and headache. Patient instructed to call the office should any adverse effect occur.  The patient verbalized understanding of the proper use and possible adverse effects of Otezla.  All the patient's questions and concerns were addressed. Sski Pregnancy And Lactation Text: This medication is Pregnancy Category D and isn't considered safe during pregnancy. It is excreted in breast milk. Tremfya Counseling: I discussed with the patient the risks of guselkumab including but not limited to immunosuppression, serious infections, and drug reactions.  The patient understands that monitoring is required including a PPD at baseline and must alert us or the primary physician if symptoms of infection or other concerning signs are noted. Topical Retinoid counseling:  Patient advised to apply a pea-sized amount only at bedtime and wait 30 minutes after washing their face before applying.  If too drying, patient may add a non-comedogenic moisturizer. The patient verbalized understanding of the proper use and possible adverse effects of retinoids.  All of the patient's questions and concerns were addressed. Cimzia Pregnancy And Lactation Text: This medication crosses the placenta but can be considered safe in certain situations. Cimzia may be excreted in breast milk. Bexarotene Pregnancy And Lactation Text: This medication is Pregnancy Category X and should not be given to women who are pregnant or may become pregnant. This medication should not be used if you are breast feeding. Colchicine Counseling:  Patient counseled regarding adverse effects including but not limited to stomach upset (nausea, vomiting, stomach pain, or diarrhea).  Patient instructed to limit alcohol consumption while taking this medication.  Colchicine may reduce blood counts especially with prolonged use.  The patient understands that monitoring of kidney function and blood counts may be required, especially at baseline. The patient verbalized understanding of the proper use and possible adverse effects of colchicine.  All of the patient's questions and concerns were addressed. Griseofulvin Counseling:  I discussed with the patient the risks of griseofulvin including but not limited to photosensitivity, cytopenia, liver damage, nausea/vomiting and severe allergy.  The patient understands that this medication is best absorbed when taken with a fatty meal (e.g., ice cream or french fries). High Dose Vitamin A Counseling: Side effects reviewed, pt to contact office should one occur. Glycopyrrolate Pregnancy And Lactation Text: This medication is Pregnancy Category B and is considered safe during pregnancy. It is unknown if it is excreted breast milk. Tazorac Pregnancy And Lactation Text: This medication is not safe during pregnancy. It is unknown if this medication is excreted in breast milk. Topical Clindamycin Pregnancy And Lactation Text: This medication is Pregnancy Category B and is considered safe during pregnancy. It is unknown if it is excreted in breast milk. Minoxidil Counseling: Minoxidil is a topical medication which can increase blood flow where it is applied. It is uncertain how this medication increases hair growth. Side effects are uncommon and include stinging and allergic reactions. Azithromycin Counseling:  I discussed with the patient the risks of azithromycin including but not limited to GI upset, allergic reaction, drug rash, diarrhea, and yeast infections. Spironolactone Pregnancy And Lactation Text: This medication can cause feminization of the male fetus and should be avoided during pregnancy. The active metabolite is also found in breast milk. Erivedge Counseling- I discussed with the patient the risks of Erivedge including but not limited to nausea, vomiting, diarrhea, constipation, weight loss, changes in the sense of taste, decreased appetite, muscle spasms, and hair loss.  The patient verbalized understanding of the proper use and possible adverse effects of Erivedge.  All of the patient's questions and concerns were addressed. Nsaids Pregnancy And Lactation Text: These medications are considered safe up to 30 weeks gestation. It is excreted in breast milk.

## 2022-05-04 ENCOUNTER — TRANSCRIPTION ENCOUNTER (OUTPATIENT)
Age: 52
End: 2022-05-04

## 2022-05-04 ENCOUNTER — APPOINTMENT (OUTPATIENT)
Dept: NEUROLOGY | Facility: CLINIC | Age: 52
End: 2022-05-04

## 2022-05-04 ENCOUNTER — APPOINTMENT (OUTPATIENT)
Dept: CARDIOLOGY | Facility: CLINIC | Age: 52
End: 2022-05-04

## 2022-05-04 DIAGNOSIS — U07.1 COVID-19: ICD-10-CM

## 2022-05-16 ENCOUNTER — NON-APPOINTMENT (OUTPATIENT)
Age: 52
End: 2022-05-16

## 2022-05-16 ENCOUNTER — APPOINTMENT (OUTPATIENT)
Dept: ELECTROPHYSIOLOGY | Facility: CLINIC | Age: 52
End: 2022-05-16
Payer: COMMERCIAL

## 2022-05-16 PROCEDURE — 93298 REM INTERROG DEV EVAL SCRMS: CPT

## 2022-05-16 PROCEDURE — G2066: CPT

## 2022-06-12 NOTE — ASU DISCHARGE PLAN (ADULT/PEDIATRIC) - MODE OF TRANSPORTATION
[facility] EM[facility]ENCY DEPARTMENT ENCOUNTER - SIGN-OUT/ADDENDUM NOTE    Basic Information  Patient: Angy Madrid Age: 52 year old Sex: female  MRN: [id number] Encounter Date: 2022, 6:47 PM      The patient was endorsed to me by Dr. Jose A Hunter at 1545, pending placement.    ED Course  Vitals:    22 0910 22 1010 22 1640 22 1750   BP: (!) 129/99  (!) 171/89 (!) 157/107   BP Location: RUE - Right upper extremity  LUE - Left upper extremity LUE - Left upper extremity   Patient Position: Semi-Jaimes's  Sitting Sitting   Pulse: 75 70 93 78   Resp: (!) 21 16 18 18   Temp:       TempSrc:       SpO2: 94% 91% 96% 92%   Weight:           MEDICAL DECISION MAKIN: pt cooperative.  Sitter at bedside.  She has very pressured speech.  Pleasant though.  She notes her symptoms from scabies infection resolved.  She has no itching.  On exam, she has no sign of any rash.  She notes she did the permethrin cream as prescribed previously.    :  informs me that her last positive covid test was 22, hospital policy is pt needs to be 10 days post last positive test for inpatient psych placement  Pt will need to be admitted medically at this time.  I informed pt of this, she tells me that \"this is all stupid, if I wanted to kill myself I would go somewhere where no one could find me and do it.\"    : I spoke with Ms Forrest NP for Dr Jarvis, accepts admit.      Impression and Plan  Diagnosis:  1. Overdose of benzodiazepine, intentional self-harm, initial encounter (CMS/[facility])        Condition:  STABLE        Disposition:  Admit 2022  9:16 PM  Telemetry Bed?: No  Admitting Physician: SARAH JARVIS [[id number]]  Comments: needs 1:1 sitter  Is this a telephone or verbal order?: This is a telephone order from the admitting physician       Eric Vicente,   [id number]     Ambulatory

## 2022-06-20 ENCOUNTER — NON-APPOINTMENT (OUTPATIENT)
Age: 52
End: 2022-06-20

## 2022-06-20 ENCOUNTER — APPOINTMENT (OUTPATIENT)
Dept: ELECTROPHYSIOLOGY | Facility: CLINIC | Age: 52
End: 2022-06-20
Payer: COMMERCIAL

## 2022-06-20 PROCEDURE — G2066: CPT

## 2022-06-20 PROCEDURE — 93298 REM INTERROG DEV EVAL SCRMS: CPT

## 2022-07-25 ENCOUNTER — RX RENEWAL (OUTPATIENT)
Age: 52
End: 2022-07-25

## 2022-07-26 ENCOUNTER — NON-APPOINTMENT (OUTPATIENT)
Age: 52
End: 2022-07-26

## 2022-07-26 ENCOUNTER — APPOINTMENT (OUTPATIENT)
Dept: ELECTROPHYSIOLOGY | Facility: CLINIC | Age: 52
End: 2022-07-26

## 2022-07-26 PROCEDURE — 93298 REM INTERROG DEV EVAL SCRMS: CPT

## 2022-07-26 PROCEDURE — G2066: CPT

## 2022-08-29 ENCOUNTER — NON-APPOINTMENT (OUTPATIENT)
Age: 52
End: 2022-08-29

## 2022-08-29 ENCOUNTER — APPOINTMENT (OUTPATIENT)
Dept: ELECTROPHYSIOLOGY | Facility: CLINIC | Age: 52
End: 2022-08-29

## 2022-08-29 PROCEDURE — G2066: CPT

## 2022-08-29 PROCEDURE — 93298 REM INTERROG DEV EVAL SCRMS: CPT

## 2022-10-03 ENCOUNTER — APPOINTMENT (OUTPATIENT)
Dept: ELECTROPHYSIOLOGY | Facility: CLINIC | Age: 52
End: 2022-10-03

## 2022-10-03 ENCOUNTER — NON-APPOINTMENT (OUTPATIENT)
Age: 52
End: 2022-10-03

## 2022-10-03 PROCEDURE — G2066: CPT

## 2022-10-03 PROCEDURE — 93298 REM INTERROG DEV EVAL SCRMS: CPT

## 2022-10-31 ENCOUNTER — RESULT REVIEW (OUTPATIENT)
Age: 52
End: 2022-10-31

## 2022-11-02 ENCOUNTER — NON-APPOINTMENT (OUTPATIENT)
Age: 52
End: 2022-11-02

## 2022-11-02 ENCOUNTER — APPOINTMENT (OUTPATIENT)
Dept: CARDIOLOGY | Facility: CLINIC | Age: 52
End: 2022-11-02

## 2022-11-02 VITALS
SYSTOLIC BLOOD PRESSURE: 151 MMHG | TEMPERATURE: 97.6 F | OXYGEN SATURATION: 99 % | DIASTOLIC BLOOD PRESSURE: 88 MMHG | BODY MASS INDEX: 33.33 KG/M2 | WEIGHT: 165 LBS | HEART RATE: 82 BPM | RESPIRATION RATE: 12 BRPM

## 2022-11-02 PROCEDURE — 93000 ELECTROCARDIOGRAM COMPLETE: CPT

## 2022-11-02 PROCEDURE — 99213 OFFICE O/P EST LOW 20 MIN: CPT | Mod: 25

## 2022-11-02 NOTE — REASON FOR VISIT
[Arrhythmia/ECG Abnorrmalities] : arrhythmia/ECG abnormalities [Symptom and Test Evaluation] : symptom and test evaluation [Hypertension] : hypertension

## 2022-11-02 NOTE — REVIEW OF SYSTEMS
[Weight Loss (___ Lbs)] : [unfilled] ~Ulb weight loss [Negative] : Heme/Lymph [Weight Gain (___ Lbs)] : no recent weight gain [SOB] : no shortness of breath [Dyspnea on exertion] : not dyspnea during exertion [Chest Discomfort] : no chest discomfort [Lower Ext Edema] : no extremity edema [Leg Claudication] : no intermittent leg claudication [Palpitations] : no palpitations [Orthopnea] : no orthopnea [PND] : no PND [Syncope] : no syncope

## 2022-11-02 NOTE — HISTORY OF PRESENT ILLNESS
[FreeTextEntry1] : Aubree was stopped HCTZ and only on enalapril. Started getting SOB and could not walk much. Called and put her back on it. K was low. SOB on one flight of stairs. Took COVID test which was negative. \par Lots of stress over the summer with working a lot and needing new boiler. Eating a lot wrong foods and gained weight. \par Started on allegra for allergies.

## 2022-11-02 NOTE — DISCUSSION/SUMMARY
[EKG obtained to assist in diagnosis and management of assessed problem(s)] : EKG obtained to assist in diagnosis and management of assessed problem(s) [FreeTextEntry1] : The patient is a 52-year-old female HTN, Bell's Palsy, ILR , GERD with symptomatic htn.\par #1 Htn- continue enalapril 10/25mg and potassium may be started, discussed possible spironolactone.If no improvement in symptoms back on medication then needs cardiac w/u.\par #2 Neuro-  [place] Palsy\par - no PFO\par - miscarriages- hematology evaluation negative\par - carotid- doppler negative\par #3 Lipids- continue atorvastatin\par #4 ILR- APC and VPC only, continue toprol 12.5mg daily\par #5 GI- reflux causing palpitations, f/u GI\par #6 General- We discussed adherence to a Mediterranean diet, weight loss and at least 30 minutes of daily exercise. Recieved COVID vaccines.\par addendum: K was 3.3

## 2022-11-07 ENCOUNTER — NON-APPOINTMENT (OUTPATIENT)
Age: 52
End: 2022-11-07

## 2022-11-07 ENCOUNTER — APPOINTMENT (OUTPATIENT)
Dept: ELECTROPHYSIOLOGY | Facility: CLINIC | Age: 52
End: 2022-11-07

## 2022-11-07 PROCEDURE — G2066: CPT

## 2022-11-07 PROCEDURE — 93298 REM INTERROG DEV EVAL SCRMS: CPT

## 2022-11-09 RX ORDER — ENALAPRIL MALEATE AND HYDROCHLOROTHIAZIDE 10; 25 MG/1; MG/1
10-25 TABLET ORAL DAILY
Qty: 90 | Refills: 3 | Status: ACTIVE | COMMUNITY
Start: 2021-05-06 | End: 1900-01-01

## 2022-11-10 ENCOUNTER — APPOINTMENT (OUTPATIENT)
Dept: CARDIOLOGY | Facility: CLINIC | Age: 52
End: 2022-11-10

## 2022-11-10 VITALS
HEIGHT: 59 IN | TEMPERATURE: 96.9 F | OXYGEN SATURATION: 100 % | SYSTOLIC BLOOD PRESSURE: 144 MMHG | HEART RATE: 81 BPM | BODY MASS INDEX: 33.26 KG/M2 | WEIGHT: 165 LBS | DIASTOLIC BLOOD PRESSURE: 85 MMHG | RESPIRATION RATE: 12 BRPM

## 2022-11-10 DIAGNOSIS — I49.1 ATRIAL PREMATURE DEPOLARIZATION: ICD-10-CM

## 2022-11-10 PROCEDURE — 99213 OFFICE O/P EST LOW 20 MIN: CPT

## 2022-11-10 RX ORDER — TINIDAZOLE 500 MG/1
500 TABLET, FILM COATED ORAL
Qty: 8 | Refills: 0 | Status: DISCONTINUED | COMMUNITY
Start: 2022-09-19

## 2022-11-10 RX ORDER — NIRMATRELVIR AND RITONAVIR 300-100 MG
20 X 150 MG & KIT ORAL
Qty: 1 | Refills: 0 | Status: COMPLETED | COMMUNITY
Start: 2022-05-04 | End: 2022-11-10

## 2022-11-10 RX ORDER — POTASSIUM CHLORIDE 750 MG/1
10 TABLET, FILM COATED, EXTENDED RELEASE ORAL
Qty: 90 | Refills: 0 | Status: DISCONTINUED | COMMUNITY
Start: 2022-11-01

## 2022-11-10 RX ORDER — HYDROCHLOROTHIAZIDE 25 MG/1
25 TABLET ORAL
Qty: 90 | Refills: 0 | Status: DISCONTINUED | COMMUNITY
Start: 2022-11-01

## 2022-11-10 RX ORDER — AZELASTINE HYDROCHLORIDE 0.5 MG/ML
0.05 SOLUTION/ DROPS OPHTHALMIC
Qty: 6 | Refills: 0 | Status: DISCONTINUED | COMMUNITY
Start: 2022-11-01

## 2022-11-10 NOTE — DISCUSSION/SUMMARY
[___ Week(s)] : in [unfilled] week(s) [FreeTextEntry1] : The patient is a 52-year-old female HTN, Bell's Palsy, ILR , GERD with symptomatic htn.\par #1 Htn- continue enalapril 10/25mg and potassium may be started, add spironolactone.If no improvement in symptoms back on medication then needs cardiac w/u.\par #2 Neuro-  [place] Palsy\par - no PFO\par - miscarriages- hematology evaluation negative\par - carotid- doppler negative\par #3 Lipids- continue atorvastatin\par #4 ILR- APC and VPC only, continue toprol 12.5mg daily\par #5 GI- reflux causing palpitations, f/u GI\par #6 General- We discussed adherence to a Mediterranean diet, weight loss and at least 30 minutes of daily exercise. Recieved COVID vaccines.\par addendum: K was 3.3 will repeat on spironolactone

## 2022-11-10 NOTE — HISTORY OF PRESENT ILLNESS
[FreeTextEntry1] : Aubree has not been feeling well. Headaches, BP low and also very high. Was sent home from work. Started on separate hctz but does not like how she feels. She had been emailing me and we decided to go back to 10/25mg but not started. She was told she is NOT allergic to hctz.

## 2022-11-28 NOTE — ED CDU PROVIDER SUBSEQUENT DAY NOTE - MEDICAL DECISION MAKING DETAILS
dentures/wheelchair
48F with PMH of HTN, not on AC, presented to ED for Left facial droop, speech changes, and nausea; resolved while in ED.  CT head neg.  Neuro consulted.  Pt was dispo'd to CDU for MRI to eval for CVA vs TIA.  Home BP meds held as per Neuro recs for permissive HTN.

## 2022-11-29 ENCOUNTER — APPOINTMENT (OUTPATIENT)
Dept: NEUROLOGY | Facility: CLINIC | Age: 52
End: 2022-11-29

## 2022-11-29 VITALS
HEART RATE: 97 BPM | HEIGHT: 59 IN | WEIGHT: 160 LBS | SYSTOLIC BLOOD PRESSURE: 119 MMHG | DIASTOLIC BLOOD PRESSURE: 76 MMHG | BODY MASS INDEX: 32.25 KG/M2

## 2022-11-29 PROCEDURE — 99213 OFFICE O/P EST LOW 20 MIN: CPT

## 2022-12-01 ENCOUNTER — APPOINTMENT (OUTPATIENT)
Dept: CARDIOLOGY | Facility: CLINIC | Age: 52
End: 2022-12-01

## 2022-12-01 ENCOUNTER — NON-APPOINTMENT (OUTPATIENT)
Age: 52
End: 2022-12-01

## 2022-12-01 VITALS
RESPIRATION RATE: 12 BRPM | WEIGHT: 160 LBS | SYSTOLIC BLOOD PRESSURE: 125 MMHG | BODY MASS INDEX: 32.25 KG/M2 | OXYGEN SATURATION: 98 % | TEMPERATURE: 97.3 F | HEART RATE: 83 BPM | HEIGHT: 59 IN | DIASTOLIC BLOOD PRESSURE: 84 MMHG

## 2022-12-01 PROCEDURE — 99213 OFFICE O/P EST LOW 20 MIN: CPT

## 2022-12-01 NOTE — DISCUSSION/SUMMARY
[FreeTextEntry1] : The patient is a 52-year-old female HTN, Bell's Palsy, ILR , GERD whose BP is markedly improved.\par #1 Htn- continue enalapril 10/25mg and  spironolactone 25mg\par #2 Neuro-  [place] Palsy\par - no PFO\par - miscarriages- hematology evaluation negative\par - carotid- doppler negative\par #3 Lipids- continue atorvastatin\par #4 ILR- APC and VPC only, continue toprol 12.5mg daily\par #5 GI- reflux causing palpitations, f/u GI\par #6 General- We discussed adherence to a Mediterranean diet, weight loss and at least 30 minutes of daily exercise. Recieved COVID vaccines.

## 2022-12-01 NOTE — HISTORY OF PRESENT ILLNESS
[FreeTextEntry1] : Aubree feels so much better. No headaches no nausea. Anxious to return to exercise.

## 2022-12-13 ENCOUNTER — NON-APPOINTMENT (OUTPATIENT)
Age: 52
End: 2022-12-13

## 2022-12-13 ENCOUNTER — APPOINTMENT (OUTPATIENT)
Dept: ELECTROPHYSIOLOGY | Facility: CLINIC | Age: 52
End: 2022-12-13

## 2022-12-13 PROCEDURE — 93298 REM INTERROG DEV EVAL SCRMS: CPT

## 2022-12-13 PROCEDURE — G2066: CPT

## 2023-01-17 ENCOUNTER — NON-APPOINTMENT (OUTPATIENT)
Age: 53
End: 2023-01-17

## 2023-01-17 ENCOUNTER — APPOINTMENT (OUTPATIENT)
Dept: ELECTROPHYSIOLOGY | Facility: CLINIC | Age: 53
End: 2023-01-17
Payer: COMMERCIAL

## 2023-01-17 PROCEDURE — G2066: CPT

## 2023-01-17 PROCEDURE — 93298 REM INTERROG DEV EVAL SCRMS: CPT

## 2023-02-13 ENCOUNTER — NON-APPOINTMENT (OUTPATIENT)
Age: 53
End: 2023-02-13

## 2023-02-13 ENCOUNTER — APPOINTMENT (OUTPATIENT)
Dept: CARDIOLOGY | Facility: CLINIC | Age: 53
End: 2023-02-13
Payer: COMMERCIAL

## 2023-02-13 VITALS
HEART RATE: 88 BPM | DIASTOLIC BLOOD PRESSURE: 85 MMHG | TEMPERATURE: 97.2 F | BODY MASS INDEX: 32.05 KG/M2 | RESPIRATION RATE: 12 BRPM | WEIGHT: 159 LBS | SYSTOLIC BLOOD PRESSURE: 127 MMHG | HEIGHT: 59 IN | OXYGEN SATURATION: 98 %

## 2023-02-13 DIAGNOSIS — I47.1 SUPRAVENTRICULAR TACHYCARDIA: ICD-10-CM

## 2023-02-13 PROCEDURE — 93000 ELECTROCARDIOGRAM COMPLETE: CPT

## 2023-02-13 PROCEDURE — 99213 OFFICE O/P EST LOW 20 MIN: CPT | Mod: 25

## 2023-02-13 NOTE — DISCUSSION/SUMMARY
[EKG obtained to assist in diagnosis and management of assessed problem(s)] : EKG obtained to assist in diagnosis and management of assessed problem(s) [FreeTextEntry1] : The patient is a 52-year-old female HTN, Bell's Palsy, ILR , GERD whose BP is low in the setting of migraine\par #1 Htn- continue enalapril 10/25mg and hold spironolactone 12.5mg\par #2 Neuro-  [place] Palsy\par - no PFO\par - miscarriages- hematology evaluation negative\par - carotid- doppler negative\par - migraine- excedrin migraine and see pcp tomorrow\par #3 Lipids- continue atorvastatin\par #4 ILR- APC and VPC only, continue toprol 12.5mg daily\par #5 GI- reflux causing palpitations, f/u GI\par #6 General- We discussed adherence to a Mediterranean diet, weight loss and at least 30 minutes of daily exercise. Recieved COVID vaccines.

## 2023-02-13 NOTE — HISTORY OF PRESENT ILLNESS
[FreeTextEntry1] : Aubree was at work and started to have migraine then noted to not be walking straight. BP was 100/70 and presents now for further evaluation and still has headache.

## 2023-02-14 ENCOUNTER — NON-APPOINTMENT (OUTPATIENT)
Age: 53
End: 2023-02-14

## 2023-02-22 ENCOUNTER — APPOINTMENT (OUTPATIENT)
Dept: ELECTROPHYSIOLOGY | Facility: CLINIC | Age: 53
End: 2023-02-22
Payer: COMMERCIAL

## 2023-02-22 ENCOUNTER — NON-APPOINTMENT (OUTPATIENT)
Age: 53
End: 2023-02-22

## 2023-02-22 PROCEDURE — 93298 REM INTERROG DEV EVAL SCRMS: CPT

## 2023-02-22 PROCEDURE — G2066: CPT

## 2023-04-03 ENCOUNTER — APPOINTMENT (OUTPATIENT)
Dept: NEUROLOGY | Facility: CLINIC | Age: 53
End: 2023-04-03

## 2023-04-05 ENCOUNTER — APPOINTMENT (OUTPATIENT)
Dept: CARDIOLOGY | Facility: CLINIC | Age: 53
End: 2023-04-05

## 2023-05-03 ENCOUNTER — APPOINTMENT (OUTPATIENT)
Dept: CARDIOLOGY | Facility: CLINIC | Age: 53
End: 2023-05-03

## 2023-05-03 DIAGNOSIS — R42 DIZZINESS AND GIDDINESS: ICD-10-CM

## 2023-11-29 ENCOUNTER — EMERGENCY (EMERGENCY)
Facility: HOSPITAL | Age: 53
LOS: 1 days | Discharge: HOME | End: 2023-11-29
Attending: EMERGENCY MEDICINE | Admitting: EMERGENCY MEDICINE
Payer: COMMERCIAL

## 2023-11-29 VITALS
HEART RATE: 82 BPM | OXYGEN SATURATION: 99 % | SYSTOLIC BLOOD PRESSURE: 136 MMHG | DIASTOLIC BLOOD PRESSURE: 79 MMHG | RESPIRATION RATE: 16 BRPM | TEMPERATURE: 99 F

## 2023-11-29 VITALS
HEART RATE: 119 BPM | OXYGEN SATURATION: 100 % | RESPIRATION RATE: 15 BRPM | SYSTOLIC BLOOD PRESSURE: 152 MMHG | TEMPERATURE: 98 F | DIASTOLIC BLOOD PRESSURE: 97 MMHG

## 2023-11-29 DIAGNOSIS — Z95.818 PRESENCE OF OTHER CARDIAC IMPLANTS AND GRAFTS: Chronic | ICD-10-CM

## 2023-11-29 DIAGNOSIS — Z98.89 OTHER SPECIFIED POSTPROCEDURAL STATES: Chronic | ICD-10-CM

## 2023-11-29 DIAGNOSIS — Z90.79 ACQUIRED ABSENCE OF OTHER GENITAL ORGAN(S): Chronic | ICD-10-CM

## 2023-11-29 LAB
ALBUMIN SERPL ELPH-MCNC: 4.2 G/DL — SIGNIFICANT CHANGE UP (ref 3.3–5)
ALBUMIN SERPL ELPH-MCNC: 4.2 G/DL — SIGNIFICANT CHANGE UP (ref 3.3–5)
ALP SERPL-CCNC: 66 U/L — SIGNIFICANT CHANGE UP (ref 40–120)
ALP SERPL-CCNC: 66 U/L — SIGNIFICANT CHANGE UP (ref 40–120)
ALT FLD-CCNC: 23 U/L — SIGNIFICANT CHANGE UP (ref 4–33)
ALT FLD-CCNC: 23 U/L — SIGNIFICANT CHANGE UP (ref 4–33)
ANION GAP SERPL CALC-SCNC: 12 MMOL/L — SIGNIFICANT CHANGE UP (ref 7–14)
ANION GAP SERPL CALC-SCNC: 12 MMOL/L — SIGNIFICANT CHANGE UP (ref 7–14)
AST SERPL-CCNC: 28 U/L — SIGNIFICANT CHANGE UP (ref 4–32)
AST SERPL-CCNC: 28 U/L — SIGNIFICANT CHANGE UP (ref 4–32)
BASOPHILS # BLD AUTO: 0.03 K/UL — SIGNIFICANT CHANGE UP (ref 0–0.2)
BASOPHILS # BLD AUTO: 0.03 K/UL — SIGNIFICANT CHANGE UP (ref 0–0.2)
BASOPHILS NFR BLD AUTO: 0.5 % — SIGNIFICANT CHANGE UP (ref 0–2)
BASOPHILS NFR BLD AUTO: 0.5 % — SIGNIFICANT CHANGE UP (ref 0–2)
BILIRUB SERPL-MCNC: <0.2 MG/DL — SIGNIFICANT CHANGE UP (ref 0.2–1.2)
BILIRUB SERPL-MCNC: <0.2 MG/DL — SIGNIFICANT CHANGE UP (ref 0.2–1.2)
BUN SERPL-MCNC: 17 MG/DL — SIGNIFICANT CHANGE UP (ref 7–23)
BUN SERPL-MCNC: 17 MG/DL — SIGNIFICANT CHANGE UP (ref 7–23)
CALCIUM SERPL-MCNC: 8.9 MG/DL — SIGNIFICANT CHANGE UP (ref 8.4–10.5)
CALCIUM SERPL-MCNC: 8.9 MG/DL — SIGNIFICANT CHANGE UP (ref 8.4–10.5)
CHLORIDE SERPL-SCNC: 103 MMOL/L — SIGNIFICANT CHANGE UP (ref 98–107)
CHLORIDE SERPL-SCNC: 103 MMOL/L — SIGNIFICANT CHANGE UP (ref 98–107)
CO2 SERPL-SCNC: 26 MMOL/L — SIGNIFICANT CHANGE UP (ref 22–31)
CO2 SERPL-SCNC: 26 MMOL/L — SIGNIFICANT CHANGE UP (ref 22–31)
CREAT SERPL-MCNC: 0.61 MG/DL — SIGNIFICANT CHANGE UP (ref 0.5–1.3)
CREAT SERPL-MCNC: 0.61 MG/DL — SIGNIFICANT CHANGE UP (ref 0.5–1.3)
EGFR: 108 ML/MIN/1.73M2 — SIGNIFICANT CHANGE UP
EGFR: 108 ML/MIN/1.73M2 — SIGNIFICANT CHANGE UP
EOSINOPHIL # BLD AUTO: 0.1 K/UL — SIGNIFICANT CHANGE UP (ref 0–0.5)
EOSINOPHIL # BLD AUTO: 0.1 K/UL — SIGNIFICANT CHANGE UP (ref 0–0.5)
EOSINOPHIL NFR BLD AUTO: 1.6 % — SIGNIFICANT CHANGE UP (ref 0–6)
EOSINOPHIL NFR BLD AUTO: 1.6 % — SIGNIFICANT CHANGE UP (ref 0–6)
GLUCOSE SERPL-MCNC: 107 MG/DL — HIGH (ref 70–99)
GLUCOSE SERPL-MCNC: 107 MG/DL — HIGH (ref 70–99)
HCT VFR BLD CALC: 40.4 % — SIGNIFICANT CHANGE UP (ref 34.5–45)
HCT VFR BLD CALC: 40.4 % — SIGNIFICANT CHANGE UP (ref 34.5–45)
HGB BLD-MCNC: 14.2 G/DL — SIGNIFICANT CHANGE UP (ref 11.5–15.5)
HGB BLD-MCNC: 14.2 G/DL — SIGNIFICANT CHANGE UP (ref 11.5–15.5)
IANC: 3.99 K/UL — SIGNIFICANT CHANGE UP (ref 1.8–7.4)
IANC: 3.99 K/UL — SIGNIFICANT CHANGE UP (ref 1.8–7.4)
IMM GRANULOCYTES NFR BLD AUTO: 0.3 % — SIGNIFICANT CHANGE UP (ref 0–0.9)
IMM GRANULOCYTES NFR BLD AUTO: 0.3 % — SIGNIFICANT CHANGE UP (ref 0–0.9)
LYMPHOCYTES # BLD AUTO: 1.8 K/UL — SIGNIFICANT CHANGE UP (ref 1–3.3)
LYMPHOCYTES # BLD AUTO: 1.8 K/UL — SIGNIFICANT CHANGE UP (ref 1–3.3)
LYMPHOCYTES # BLD AUTO: 28 % — SIGNIFICANT CHANGE UP (ref 13–44)
LYMPHOCYTES # BLD AUTO: 28 % — SIGNIFICANT CHANGE UP (ref 13–44)
MCHC RBC-ENTMCNC: 29.7 PG — SIGNIFICANT CHANGE UP (ref 27–34)
MCHC RBC-ENTMCNC: 29.7 PG — SIGNIFICANT CHANGE UP (ref 27–34)
MCHC RBC-ENTMCNC: 35.1 GM/DL — SIGNIFICANT CHANGE UP (ref 32–36)
MCHC RBC-ENTMCNC: 35.1 GM/DL — SIGNIFICANT CHANGE UP (ref 32–36)
MCV RBC AUTO: 84.5 FL — SIGNIFICANT CHANGE UP (ref 80–100)
MCV RBC AUTO: 84.5 FL — SIGNIFICANT CHANGE UP (ref 80–100)
MONOCYTES # BLD AUTO: 0.48 K/UL — SIGNIFICANT CHANGE UP (ref 0–0.9)
MONOCYTES # BLD AUTO: 0.48 K/UL — SIGNIFICANT CHANGE UP (ref 0–0.9)
MONOCYTES NFR BLD AUTO: 7.5 % — SIGNIFICANT CHANGE UP (ref 2–14)
MONOCYTES NFR BLD AUTO: 7.5 % — SIGNIFICANT CHANGE UP (ref 2–14)
NEUTROPHILS # BLD AUTO: 3.99 K/UL — SIGNIFICANT CHANGE UP (ref 1.8–7.4)
NEUTROPHILS # BLD AUTO: 3.99 K/UL — SIGNIFICANT CHANGE UP (ref 1.8–7.4)
NEUTROPHILS NFR BLD AUTO: 62.1 % — SIGNIFICANT CHANGE UP (ref 43–77)
NEUTROPHILS NFR BLD AUTO: 62.1 % — SIGNIFICANT CHANGE UP (ref 43–77)
NRBC # BLD: 0 /100 WBCS — SIGNIFICANT CHANGE UP (ref 0–0)
NRBC # BLD: 0 /100 WBCS — SIGNIFICANT CHANGE UP (ref 0–0)
NRBC # FLD: 0 K/UL — SIGNIFICANT CHANGE UP (ref 0–0)
NRBC # FLD: 0 K/UL — SIGNIFICANT CHANGE UP (ref 0–0)
PLATELET # BLD AUTO: 207 K/UL — SIGNIFICANT CHANGE UP (ref 150–400)
PLATELET # BLD AUTO: 207 K/UL — SIGNIFICANT CHANGE UP (ref 150–400)
POTASSIUM SERPL-MCNC: 4 MMOL/L — SIGNIFICANT CHANGE UP (ref 3.5–5.3)
POTASSIUM SERPL-MCNC: 4 MMOL/L — SIGNIFICANT CHANGE UP (ref 3.5–5.3)
POTASSIUM SERPL-SCNC: 4 MMOL/L — SIGNIFICANT CHANGE UP (ref 3.5–5.3)
POTASSIUM SERPL-SCNC: 4 MMOL/L — SIGNIFICANT CHANGE UP (ref 3.5–5.3)
PROT SERPL-MCNC: 7.2 G/DL — SIGNIFICANT CHANGE UP (ref 6–8.3)
PROT SERPL-MCNC: 7.2 G/DL — SIGNIFICANT CHANGE UP (ref 6–8.3)
RBC # BLD: 4.78 M/UL — SIGNIFICANT CHANGE UP (ref 3.8–5.2)
RBC # BLD: 4.78 M/UL — SIGNIFICANT CHANGE UP (ref 3.8–5.2)
RBC # FLD: 13.2 % — SIGNIFICANT CHANGE UP (ref 10.3–14.5)
RBC # FLD: 13.2 % — SIGNIFICANT CHANGE UP (ref 10.3–14.5)
SODIUM SERPL-SCNC: 141 MMOL/L — SIGNIFICANT CHANGE UP (ref 135–145)
SODIUM SERPL-SCNC: 141 MMOL/L — SIGNIFICANT CHANGE UP (ref 135–145)
TROPONIN T, HIGH SENSITIVITY RESULT: <6 NG/L — SIGNIFICANT CHANGE UP
TROPONIN T, HIGH SENSITIVITY RESULT: <6 NG/L — SIGNIFICANT CHANGE UP
WBC # BLD: 6.42 K/UL — SIGNIFICANT CHANGE UP (ref 3.8–10.5)
WBC # BLD: 6.42 K/UL — SIGNIFICANT CHANGE UP (ref 3.8–10.5)
WBC # FLD AUTO: 6.42 K/UL — SIGNIFICANT CHANGE UP (ref 3.8–10.5)
WBC # FLD AUTO: 6.42 K/UL — SIGNIFICANT CHANGE UP (ref 3.8–10.5)

## 2023-11-29 PROCEDURE — 71046 X-RAY EXAM CHEST 2 VIEWS: CPT | Mod: 26

## 2023-11-29 PROCEDURE — 99285 EMERGENCY DEPT VISIT HI MDM: CPT

## 2023-11-29 NOTE — ED PROVIDER NOTE - PHYSICAL EXAMINATION
Gen: well appearing, in no acute distress   Head: normal appearing  HEENT: normal conjunctiva, oral mucosa moist, vision grossly intact   Lung: no respiratory distress, speaking in full sentences, CTA b/l, no wheeze, crackles or rhonchi   CV: regular rate and rhythm, no murmurs, no LE edema or calf swelling   Abd: soft, non distended, non tender   MSK: no visible deformities, ambulating without difficulty, 5/5 strength in SAUD UE and LE, normal cap refill   Neuro: No focal deficits, AAOx3, normal sensation to light touch in SAUD UE, slightly decreased sensation over ulnar distribution in LUE, though good strength   Skin: Warm, no rashes   Psych: normal affect

## 2023-11-29 NOTE — ED PROVIDER NOTE - CLINICAL SUMMARY MEDICAL DECISION MAKING FREE TEXT BOX
52 y/o F with PMHx SVT, HTN, HLD, TIA x2 presents to ED for evaluation of L arm numbness/tingling onset 1 week ago. Low suspicion for ACS, and likely patients symptoms are related to c-spine tension/disc problem. Though cervical radiculopathy a more likely diagnosis, given risk factors, will check labs and trop. If negative, patient has cardiologist she can follow-up with. Will also place referral for spine center to further workup arm numbness.

## 2023-11-29 NOTE — ED PROVIDER NOTE - PATIENT PORTAL LINK FT
You can access the FollowMyHealth Patient Portal offered by [facility] by registering at the following website: [email or web address]. By joining [facility]’s FollowMyHealth portal, you will also be able to view your health information using other applications (apps) compatible with our system.

## 2023-11-29 NOTE — ED ADULT NURSE NOTE - NSFALLRISKASMT_ED_ALL_ED_DT
52 yr old female, A&Ox3. [place] speaking from home for eval of back pain x 1 yr. Pt c/o worsening pain "the last few days". Constant sharp pain 9/10. Pt reports "burning on urination and chills". Pt denies fever/nausea/vomiting. S/P neck surgery February 27/2019.
29-Nov-2023 03:57
Statement Selected

## 2023-11-29 NOTE — ED PROVIDER NOTE - ATTENDING CONTRIBUTION TO CARE
53-year-old female with a history of hypertension, hyperlipidemia previous TIA on aspirin here with tingling sensation from left lateral chest/axilla radiating medial down her left upper extremity to her left fourth and fifth digits.  Patient's have been happening for the past week intermittently.  She reports being under a lot of stress today and tonight the symptoms did not go away as quickly as they usually did.  She denies any preceding injury or heavy lifting.  No associated headache, neck pain or stiffness, chest pain, shortness of breath, back pain, abdominal pain, focal weakness, slurred speech.    Well appearing, lying comfortably in stretcher, awake and alert, nontoxic.  AF/VSS.  NCAT neck supple, no midline spinal tenderness, no nuchal rigidity.  Lungs cta bl.  Cards nl S1/S2, RRR, no MRG.  Abd soft ntnd.  No pedal edema or calf tenderness.  BL upper ext are NVI in M/U/R distribution.  No focal neuro deficits.    Sxs suggestive of peripheral neuropathy affecting L ulnar distribution.  There are no focal neuro deficits to suggest primary cns lesion/stroke.  Given L sided chest pain, will screen for acs but sxs are atypical.  Plan for labs, ekg, cxr, likely home with outpt PMD follow-up.

## 2023-11-29 NOTE — ED PROVIDER NOTE - OBJECTIVE STATEMENT
54 y/o F with PMHx SVT, HTN, HLD, TIA x2 presents to ED for evaluation of L arm numbness/tingling onset 1 week ago. Episodes will occur intermittently and then resolve spontaneously. Pulling sensation will radiate from the left lateral breast and down the left arm. Has not seen her cardiologist "in a while". Is currently under a lot of stress. No hx of disc disease in neck, recent injury, heavy lifting, car accidents or twisting/falls. Denies recent fevers, chills, palpitations, abdominal pain, n/v/d, diaphoresis, leg swelling, hx of blood clots or pleuritic chest pain. Recently started seeing a chiropractor, however does not have neck manipulations done given hx of TIA and current symptoms preceded her chiropractor appointment.

## 2023-11-29 NOTE — ED ADULT NURSE NOTE - OBJECTIVE STATEMENT
Pt received in 24A. C/o left arm numbness radiating to 4th digit x1 week. PMHx HTN, TIA. A&Ox4, ambulatory. Breathing even and unlabored. No complaints of chest pain, headache, nausea, dizziness, vomiting  SOB, fever, or chills at this time. No facial droop or slurred speech noted. Pt has equal strength, motor, and sensation to bilateral upper and lower extremities. No drifts noted to bilateral upper and lower extremities. 22G IV placed on left AC. Labs drawn and sent.

## 2023-11-29 NOTE — ED PROVIDER NOTE - PROGRESS NOTE DETAILS
Randee PGY1: EKG unremarkable and trop less than 6, results discussed with patient. has cardiologist she can follow up with. will d/c.

## 2023-11-29 NOTE — ED PROVIDER NOTE - NSFOLLOWUPINSTRUCTIONS_ED_ALL_ED_FT
Today you were evaluated in the Emergency Department for arm and hand numbness/tingling.     Chest pain can be caused by many different conditions which may or may not be dangerous. Causes include heartburn, lung infections, heart attack, blood clot in lungs, skin infections, strain or damage to muscle, cartilage, or bones, etc. In addition to a history and physical examination, an electrocardiogram (ECG) or other lab tests may have been performed to determine the cause of your chest pain. Follow up with your primary care provider or with a cardiologist as instructed.     Please call your cardiologist to make an appointment, as you have not had a recent visit with them.     We have also placed a referral for the spine center. They can help further workup what could be causing your arm and hand numbness. Someone will call you to set up this appointment.     SEEK IMMEDIATE MEDICAL CARE IF YOU HAVE ANY OF THE FOLLOWING SYMPTOMS: worsening chest pain, coughing up blood, unexplained back/neck/jaw pain, severe abdominal pain, dizziness or lightheadedness, fainting, shortness of breath, sweaty or clammy skin, vomiting, or racing heart beat. These symptoms may represent a serious problem that is an emergency. Do not wait to see if the symptoms will go away. Get medical help right away. Call 911 and do not drive yourself to the hospital.

## 2023-12-05 ENCOUNTER — APPOINTMENT (OUTPATIENT)
Dept: ORTHOPEDIC SURGERY | Facility: CLINIC | Age: 53
End: 2023-12-05
Payer: COMMERCIAL

## 2023-12-05 VITALS — WEIGHT: 159 LBS | BODY MASS INDEX: 32.05 KG/M2 | HEIGHT: 59 IN

## 2023-12-05 DIAGNOSIS — Z86.79 PERSONAL HISTORY OF OTHER DISEASES OF THE CIRCULATORY SYSTEM: ICD-10-CM

## 2023-12-05 DIAGNOSIS — Z87.898 PERSONAL HISTORY OF OTHER SPECIFIED CONDITIONS: ICD-10-CM

## 2023-12-05 DIAGNOSIS — Z86.73 PERSONAL HISTORY OF TRANSIENT ISCHEMIC ATTACK (TIA), AND CEREBRAL INFARCTION W/OUT RESIDUAL DEFICITS: ICD-10-CM

## 2023-12-05 PROCEDURE — 99204 OFFICE O/P NEW MOD 45 MIN: CPT

## 2023-12-05 PROCEDURE — 72040 X-RAY EXAM NECK SPINE 2-3 VW: CPT

## 2023-12-06 PROBLEM — Z86.73 HISTORY OF TRANSIENT ISCHEMIC ATTACK: Status: RESOLVED | Noted: 2019-08-12 | Resolved: 2023-12-06

## 2023-12-06 PROBLEM — Z87.898 HISTORY OF HEARTBURN: Status: RESOLVED | Noted: 2023-12-06 | Resolved: 2023-12-06

## 2023-12-06 PROBLEM — Z86.79 HISTORY OF ESSENTIAL HYPERTENSION: Status: RESOLVED | Noted: 2023-12-06 | Resolved: 2023-12-06

## 2023-12-26 ENCOUNTER — APPOINTMENT (OUTPATIENT)
Dept: NEUROLOGY | Facility: CLINIC | Age: 53
End: 2023-12-26

## 2023-12-27 ENCOUNTER — APPOINTMENT (OUTPATIENT)
Dept: ORTHOPEDIC SURGERY | Facility: CLINIC | Age: 53
End: 2023-12-27

## 2023-12-29 ENCOUNTER — APPOINTMENT (OUTPATIENT)
Dept: ORTHOPEDIC SURGERY | Facility: CLINIC | Age: 53
End: 2023-12-29
Payer: COMMERCIAL

## 2023-12-29 VITALS — HEIGHT: 59 IN | BODY MASS INDEX: 32.05 KG/M2 | WEIGHT: 159 LBS

## 2023-12-29 DIAGNOSIS — M47.812 SPONDYLOSIS W/OUT MYELOPATHY OR RADICULOPATHY, CERVICAL REGION: ICD-10-CM

## 2023-12-29 DIAGNOSIS — M50.30 OTHER CERVICAL DISC DEGENERATION, UNSPECIFIED CERVICAL REGION: ICD-10-CM

## 2023-12-29 DIAGNOSIS — M54.12 RADICULOPATHY, CERVICAL REGION: ICD-10-CM

## 2023-12-29 PROCEDURE — 99213 OFFICE O/P EST LOW 20 MIN: CPT

## 2023-12-29 NOTE — DISCUSSION/SUMMARY
[Medication Risks Reviewed] : Medication risks reviewed [de-identified] : She will resume her omeprazole 40 mg a day and if after 3 days or so she is tolerating it she will start on ibuprofen 600 mg 3 times a day over-the-counter and continue it for for 5 days after the symptoms have fully resolved.  She will call if there are further problems with the medication or worsening of her symptoms and I will see her for follow-up in 4 weeks on a as needed basis.

## 2023-12-29 NOTE — PHYSICAL EXAM
[de-identified] : Range of motion of the cervical spine remains painless. [de-identified] : I reviewed her x-rays again which revealed a straightening of the normal cervical lordosis and some mild disc space narrowing.

## 2023-12-29 NOTE — HISTORY OF PRESENT ILLNESS
[de-identified] : She returns for follow-up of her symptoms of numbness and tingling in the left axilla extending the arm to her ulnar digits.  She did not have neck or arm pain when she was initially seen.  She was started on Naprosyn with omeprazole she has a history reflux symptoms.  She either had abdominal symptoms from the medicine or had a stomach bug at that point and stopped the medicines for a few days and then started taking Advil.  The numbness and tingling down the arm is fully resolved but she still gets some occasional symptoms in the axilla.  She changed her brassiere as she thought it may be due to the metal.  There have been no changes in her gait or balance.  She is also seeing a chiropractor.

## 2024-01-17 ENCOUNTER — APPOINTMENT (OUTPATIENT)
Dept: NEUROLOGY | Facility: CLINIC | Age: 54
End: 2024-01-17

## 2024-01-31 ENCOUNTER — APPOINTMENT (OUTPATIENT)
Dept: CARDIOLOGY | Facility: CLINIC | Age: 54
End: 2024-01-31
Payer: COMMERCIAL

## 2024-01-31 ENCOUNTER — NON-APPOINTMENT (OUTPATIENT)
Age: 54
End: 2024-01-31

## 2024-01-31 VITALS
BODY MASS INDEX: 32.92 KG/M2 | WEIGHT: 163 LBS | RESPIRATION RATE: 14 BRPM | SYSTOLIC BLOOD PRESSURE: 134 MMHG | TEMPERATURE: 97.4 F | HEART RATE: 94 BPM | OXYGEN SATURATION: 97 % | DIASTOLIC BLOOD PRESSURE: 80 MMHG

## 2024-01-31 DIAGNOSIS — E78.5 HYPERLIPIDEMIA, UNSPECIFIED: ICD-10-CM

## 2024-01-31 DIAGNOSIS — I10 ESSENTIAL (PRIMARY) HYPERTENSION: ICD-10-CM

## 2024-01-31 PROCEDURE — 93000 ELECTROCARDIOGRAM COMPLETE: CPT

## 2024-01-31 PROCEDURE — 99213 OFFICE O/P EST LOW 20 MIN: CPT | Mod: 25

## 2024-01-31 RX ORDER — LACTULOSE 10 G/15ML
20 SOLUTION ORAL
Refills: 0 | Status: DISCONTINUED | COMMUNITY
Start: 2021-05-06 | End: 2024-01-31

## 2024-01-31 RX ORDER — METOPROLOL SUCCINATE 25 MG/1
25 TABLET, EXTENDED RELEASE ORAL
Qty: 45 | Refills: 0 | Status: DISCONTINUED | COMMUNITY
Start: 2020-04-27 | End: 2024-01-31

## 2024-01-31 RX ORDER — SPIRONOLACTONE 25 MG/1
25 TABLET ORAL DAILY
Qty: 90 | Refills: 1 | Status: DISCONTINUED | COMMUNITY
Start: 2022-11-10 | End: 2024-01-31

## 2024-01-31 RX ORDER — POTASSIUM CHLORIDE 750 MG/1
10 TABLET, EXTENDED RELEASE ORAL
Refills: 3 | Status: ACTIVE | COMMUNITY
Start: 2024-01-31

## 2024-01-31 RX ORDER — NAPROXEN 500 MG/1
500 TABLET ORAL
Qty: 60 | Refills: 0 | Status: DISCONTINUED | COMMUNITY
Start: 2023-12-05 | End: 2024-01-31

## 2024-01-31 RX ORDER — LACTIC ACID 88% TO 92%
LIQUID (ML) MISCELLANEOUS
Refills: 0 | Status: DISCONTINUED | COMMUNITY
Start: 2021-05-06 | End: 2024-01-31

## 2024-01-31 RX ORDER — OMEPRAZOLE 40 MG/1
40 CAPSULE, DELAYED RELEASE ORAL
Qty: 30 | Refills: 1 | Status: DISCONTINUED | COMMUNITY
Start: 2023-12-05 | End: 2024-01-31

## 2024-01-31 NOTE — HISTORY OF PRESENT ILLNESS
[FreeTextEntry1] : Aubree is feeling better off spironolactone and metoprolol. PCP wants her on it but she is not taking it. Recent lipid panel elevated but she knows not eating right and gained weight. No exercise. Stress with dtr who had been in crisis for months.

## 2024-01-31 NOTE — REVIEW OF SYSTEMS
[Weight Gain (___ Lbs)] : no recent weight gain [SOB] : no shortness of breath [Dyspnea on exertion] : not dyspnea during exertion [Chest Discomfort] : no chest discomfort [Lower Ext Edema] : no extremity edema [Leg Claudication] : no intermittent leg claudication [Palpitations] : no palpitations [Orthopnea] : no orthopnea [PND] : no PND [Syncope] : no syncope

## 2024-01-31 NOTE — DISCUSSION/SUMMARY
[FreeTextEntry1] : The patient is a 53-year-old female HTN, Bell's Palsy, ILR , GERD whose BP has improved off spironolactone #1 HTN- c/w enalapril 10/25mg #2 Neuro-  [place] Palsy - no PFO - miscarriages- hematology evaluation negative - carotid- doppler negative - migraine- excedrin migraine prn #3 HLD- c/w atorvastatin, compliance discussed and repeat 6 months. #4 ILR- APC and VPC only #5 GI- reflux causing palpitations, f/u GI #6 General- We discussed adherence to a Mediterranean diet, weight loss and at least 30 minutes of daily exercise. Recieved COVID vaccines. [EKG obtained to assist in diagnosis and management of assessed problem(s)] : EKG obtained to assist in diagnosis and management of assessed problem(s)

## 2024-05-23 NOTE — STROKE CODE NOTE - STROKE TEAM ASSESSMENT
Recommend:  -labs  -reflux diet modification  -avoid nsaids, alcohol  -increase omeprazole to 40 mg/daily   -fibercon or citrucel  -er if condition decline    Egd w liya w/ Dr. Marti or Dr. Haskins  Dx:   #hpylori  #loose stools  #bloating  #gerd  #epigastric pain        Consejos para controlar el reflujo de ácido (agruras)   Para controlar el reflujo de ácido es necesario hacer algunos cambios básicos en la alimentación y el estilo de julianne. Los siguientes consejos pueden ser suficientes para aliviar el malestar.   Preste atención a lo que come  No ingiera comidas grasosas o muy condimentadas.  Coma menos alimentos ácidos, parish cítricos y alimentos a base de tomates, ya que pueden agravar los síntomas.  Limite el consumo de alcohol, cafeína y bebidas gaseosas. Todas estas bebidas aumentan el reflujo.  Intente limitar el consumo de chocolate, menta y hierbabuena. Franca puede empeorar el reflujo de ácido en algunas personas.     Vigile cuándo come  No se acueste alonso 3 horas después de elke comido.  No coma nada antes de irse a la cama.     Mantenga la jaydon levantada  Mantener la jaydon y el pecho elevados unas 4 a 6 pulgadas lo ayudará a aliviar el reflujo cuando esté acostado. Ponga soportes debajo de la cabecera de la cama o lauren cuña debajo del colchón para elevarlos.     Otros cambios  Baje de peso, si es necesario.  No amanda ejercicio poco antes de acostarse.  No use ropa ajustada.  Limite el uso de aspirina e ibuprofeno.  Deje de fumar.        © 4851-6757 [employer]. Todos los derechos reservados. Esta información no pretende sustituir la atención médica profesional. Sólo llamas médico puede diagnosticar y tratar un problema de luz.           
06-Aug-2019 13:40

## 2024-07-31 ENCOUNTER — APPOINTMENT (OUTPATIENT)
Dept: CARDIOLOGY | Facility: CLINIC | Age: 54
End: 2024-07-31

## 2024-08-02 NOTE — DISCUSSION/SUMMARY
[FreeTextEntry1] : The patient is a 53-year-old female HTN, Bell's Palsy, ILR , GERD whose BP has improved off spironolactone #1 HTN- c/w enalapril 10/25mg #2 Neuro-  [place] Palsy - no PFO - miscarriages- hematology evaluation negative - carotid- doppler negative - migraine- excedrin migraine prn #3 HLD- c/w atorvastatin, compliance discussed and repeat 6 months. #4 ILR- APC and VPC only #5 GI- reflux causing palpitations, f/u GI #6 General- We discussed adherence to a Mediterranean diet, weight loss and at least 30 minutes of daily exercise. Recieved COVID vaccines.

## 2024-10-08 ENCOUNTER — TRANSCRIPTION ENCOUNTER (OUTPATIENT)
Age: 54
End: 2024-10-08

## 2024-10-08 ENCOUNTER — OUTPATIENT (OUTPATIENT)
Dept: OUTPATIENT SERVICES | Facility: HOSPITAL | Age: 54
LOS: 1 days | End: 2024-10-08
Payer: COMMERCIAL

## 2024-10-08 VITALS
SYSTOLIC BLOOD PRESSURE: 102 MMHG | RESPIRATION RATE: 18 BRPM | OXYGEN SATURATION: 97 % | DIASTOLIC BLOOD PRESSURE: 58 MMHG

## 2024-10-08 VITALS
HEIGHT: 59 IN | OXYGEN SATURATION: 98 % | DIASTOLIC BLOOD PRESSURE: 70 MMHG | TEMPERATURE: 98 F | RESPIRATION RATE: 18 BRPM | HEART RATE: 78 BPM | WEIGHT: 164.91 LBS | SYSTOLIC BLOOD PRESSURE: 118 MMHG

## 2024-10-08 DIAGNOSIS — Z98.89 OTHER SPECIFIED POSTPROCEDURAL STATES: Chronic | ICD-10-CM

## 2024-10-08 DIAGNOSIS — Z95.818 PRESENCE OF OTHER CARDIAC IMPLANTS AND GRAFTS: Chronic | ICD-10-CM

## 2024-10-08 DIAGNOSIS — Z90.79 ACQUIRED ABSENCE OF OTHER GENITAL ORGAN(S): Chronic | ICD-10-CM

## 2024-10-08 DIAGNOSIS — I63.9 CEREBRAL INFARCTION, UNSPECIFIED: ICD-10-CM

## 2024-10-08 PROCEDURE — 33286 RMVL SUBQ CAR RHYTHM MNTR: CPT

## 2024-10-08 RX ORDER — PREDNISONE 5 MG/1
1 TABLET ORAL
Refills: 0 | DISCHARGE

## 2024-10-08 RX ORDER — ALBUTEROL 90 MCG
2 AEROSOL (GRAM) INHALATION
Refills: 0 | DISCHARGE

## 2024-10-08 RX ORDER — BENZONATATE 150 MG/1
1 CAPSULE ORAL
Refills: 0 | DISCHARGE

## 2024-10-08 RX ORDER — HYDROCHLOROTHIAZIDE 50 MG/1
1 TABLET ORAL
Refills: 0 | DISCHARGE

## 2024-10-08 NOTE — ASU DISCHARGE PLAN (ADULT/PEDIATRIC) - CARE PROVIDER_API CALL
Bryan Rodas  Cardiac Electrophysiology  [address] [place] [id number]  Phone: [phone]  Fax: [phone]  Established Patient  Follow Up Time:

## 2024-10-08 NOTE — ASU DISCHARGE PLAN (ADULT/PEDIATRIC) - ASU DC SPECIAL INSTRUCTIONSFT
WOUND CARE:  Do NOT scrub, rub, or pick at your incision site  DO NOT apply lotions, powders, ointment, or perfumes to incision  wear loose clothing around incision for 7 days  f/u appointment as instructed     ACTIVITY:   resume normal activities    Follow heart healthy diet recommended by your doctor, , if you smoke STOP SMOKING ( may call [phone] for center of tobacco control if you need assistance)     ***CALL YOUR DOCTOR***  if you experience: fever, chills, body aches, or severe pain, swelling, redness, heat or yellow discharge at incision site  If you are unable to reach your doctor, you may contact Cardiology Office at [facility] at [phone]

## 2024-10-08 NOTE — ASU PATIENT PROFILE, ADULT - FALL HARM RISK - UNIVERSAL INTERVENTIONS
Bed in lowest position, wheels locked, appropriate side rails in place/Call bell, personal items and telephone in reach/Instruct patient to call for assistance before getting out of bed or chair/Non-slip footwear when patient is out of bed/[place] to call system/Physically safe environment - no spills, clutter or unnecessary equipment/Purposeful Proactive Rounding/Room/bathroom lighting operational, light cord in reach

## 2024-10-09 ENCOUNTER — NON-APPOINTMENT (OUTPATIENT)
Age: 54
End: 2024-10-09

## 2024-10-25 ENCOUNTER — NON-APPOINTMENT (OUTPATIENT)
Age: 54
End: 2024-10-25

## 2024-10-25 ENCOUNTER — APPOINTMENT (OUTPATIENT)
Dept: ELECTROPHYSIOLOGY | Facility: CLINIC | Age: 54
End: 2024-10-25

## 2024-10-25 VITALS
DIASTOLIC BLOOD PRESSURE: 72 MMHG | WEIGHT: 165 LBS | BODY MASS INDEX: 33.33 KG/M2 | HEART RATE: 81 BPM | OXYGEN SATURATION: 98 % | SYSTOLIC BLOOD PRESSURE: 109 MMHG

## 2024-10-25 PROCEDURE — 99213 OFFICE O/P EST LOW 20 MIN: CPT

## 2024-10-28 ENCOUNTER — APPOINTMENT (OUTPATIENT)
Dept: CARDIOLOGY | Facility: CLINIC | Age: 54
End: 2024-10-28
Payer: COMMERCIAL

## 2024-10-28 VITALS
SYSTOLIC BLOOD PRESSURE: 115 MMHG | HEART RATE: 77 BPM | BODY MASS INDEX: 33.26 KG/M2 | RESPIRATION RATE: 14 BRPM | TEMPERATURE: 97.58 F | DIASTOLIC BLOOD PRESSURE: 71 MMHG | WEIGHT: 165 LBS | OXYGEN SATURATION: 100 % | HEIGHT: 59 IN

## 2024-10-28 DIAGNOSIS — I10 ESSENTIAL (PRIMARY) HYPERTENSION: ICD-10-CM

## 2024-10-28 DIAGNOSIS — I47.10 SUPRAVENTRICULAR TACHYCARDIA, UNSPECIFIED: ICD-10-CM

## 2024-10-28 DIAGNOSIS — E78.5 HYPERLIPIDEMIA, UNSPECIFIED: ICD-10-CM

## 2024-10-28 PROCEDURE — G2211 COMPLEX E/M VISIT ADD ON: CPT | Mod: NC

## 2024-10-28 PROCEDURE — 99214 OFFICE O/P EST MOD 30 MIN: CPT

## 2024-10-28 PROCEDURE — 93000 ELECTROCARDIOGRAM COMPLETE: CPT

## 2024-10-28 RX ORDER — METOPROLOL SUCCINATE 25 MG/1
25 TABLET, EXTENDED RELEASE ORAL DAILY
Qty: 90 | Refills: 1 | Status: ACTIVE | COMMUNITY
Start: 2024-10-28

## 2024-10-28 RX ORDER — HYDROCHLOROTHIAZIDE 25 MG/1
25 TABLET ORAL DAILY
Refills: 3 | Status: ACTIVE | COMMUNITY
Start: 2024-10-28

## 2024-10-28 RX ORDER — ENALAPRIL MALEATE 20 MG/1
20 TABLET ORAL
Qty: 90 | Refills: 3 | Status: ACTIVE | COMMUNITY
Start: 2024-10-28 | End: 1900-01-01

## 2025-04-07 ENCOUNTER — NON-APPOINTMENT (OUTPATIENT)
Age: 55
End: 2025-04-07

## 2025-04-07 ENCOUNTER — APPOINTMENT (OUTPATIENT)
Dept: CARDIOLOGY | Facility: CLINIC | Age: 55
End: 2025-04-07
Payer: COMMERCIAL

## 2025-04-07 VITALS
TEMPERATURE: 97.6 F | HEIGHT: 59 IN | DIASTOLIC BLOOD PRESSURE: 76 MMHG | BODY MASS INDEX: 34.68 KG/M2 | RESPIRATION RATE: 14 BRPM | OXYGEN SATURATION: 100 % | SYSTOLIC BLOOD PRESSURE: 120 MMHG | WEIGHT: 172 LBS | HEART RATE: 87 BPM

## 2025-04-07 DIAGNOSIS — I10 ESSENTIAL (PRIMARY) HYPERTENSION: ICD-10-CM

## 2025-04-07 DIAGNOSIS — E78.5 HYPERLIPIDEMIA, UNSPECIFIED: ICD-10-CM

## 2025-04-07 PROCEDURE — G2211 COMPLEX E/M VISIT ADD ON: CPT | Mod: NC

## 2025-04-07 PROCEDURE — 99214 OFFICE O/P EST MOD 30 MIN: CPT

## 2025-04-07 PROCEDURE — 93000 ELECTROCARDIOGRAM COMPLETE: CPT
